# Patient Record
Sex: MALE | Race: WHITE
[De-identification: names, ages, dates, MRNs, and addresses within clinical notes are randomized per-mention and may not be internally consistent; named-entity substitution may affect disease eponyms.]

---

## 2018-01-23 ENCOUNTER — HOSPITAL ENCOUNTER (INPATIENT)
Dept: HOSPITAL 36 - ER | Age: 78
LOS: 8 days | Discharge: NURSING, REHAB OR LONG-TERM CARE | DRG: 885 | End: 2018-01-31
Attending: PSYCHIATRY & NEUROLOGY | Admitting: PSYCHIATRY & NEUROLOGY
Payer: MEDICARE

## 2018-01-23 VITALS — DIASTOLIC BLOOD PRESSURE: 66 MMHG | SYSTOLIC BLOOD PRESSURE: 124 MMHG

## 2018-01-23 DIAGNOSIS — E87.1: ICD-10-CM

## 2018-01-23 DIAGNOSIS — N40.0: ICD-10-CM

## 2018-01-23 DIAGNOSIS — E86.0: ICD-10-CM

## 2018-01-23 DIAGNOSIS — E11.9: ICD-10-CM

## 2018-01-23 DIAGNOSIS — E78.5: ICD-10-CM

## 2018-01-23 DIAGNOSIS — I50.9: ICD-10-CM

## 2018-01-23 DIAGNOSIS — I25.10: ICD-10-CM

## 2018-01-23 DIAGNOSIS — F03.90: ICD-10-CM

## 2018-01-23 DIAGNOSIS — F41.9: ICD-10-CM

## 2018-01-23 DIAGNOSIS — N39.0: ICD-10-CM

## 2018-01-23 DIAGNOSIS — L40.9: ICD-10-CM

## 2018-01-23 DIAGNOSIS — R19.7: ICD-10-CM

## 2018-01-23 DIAGNOSIS — F33.3: Primary | ICD-10-CM

## 2018-01-23 DIAGNOSIS — I11.0: ICD-10-CM

## 2018-01-23 LAB
ALBUMIN SERPL-MCNC: 3.8 GM/DL (ref 4.2–5.5)
ALBUMIN/GLOB SERPL: 1.1 {RATIO} (ref 1–1.8)
ALP SERPL-CCNC: 51 U/L (ref 34–104)
ALT SERPL-CCNC: 21 U/L (ref 7–52)
ANION GAP SERPL CALC-SCNC: 12.5 MMOL/L (ref 7–16)
APPEARANCE UR: (no result)
AST SERPL-CCNC: 22 U/L (ref 13–39)
BACTERIA #/AREA URNS HPF: (no result) /HPF
BASOPHILS # BLD AUTO: 0.3 TH/CUMM (ref 0–0.2)
BASOPHILS NFR BLD AUTO: 2.2 % (ref 0–2)
BILIRUB SERPL-MCNC: 0.8 MG/DL (ref 0.3–1)
BILIRUB UR-MCNC: NEGATIVE MG/DL
BUN SERPL-MCNC: 45 MG/DL (ref 7–25)
CALCIUM SERPL-MCNC: 8.9 MG/DL (ref 8.6–10.3)
CHLORIDE SERPL-SCNC: 92 MEQ/L (ref 98–107)
CO2 SERPL-SCNC: 25.3 MEQ/L (ref 21–31)
COLOR UR: YELLOW
CREAT SERPL-MCNC: 1.4 MG/DL (ref 0.7–1.3)
EOSINOPHIL # BLD AUTO: 0 TH/CMM (ref 0.1–0.4)
EOSINOPHIL NFR BLD AUTO: 0.2 % (ref 0–5)
EPI CELLS URNS QL MICRO: (no result) /LPF
ERYTHROCYTE [DISTWIDTH] IN BLOOD BY AUTOMATED COUNT: 12.7 % (ref 11.5–20)
GLOBULIN SER-MCNC: 3.6 GM/DL
GLUCOSE SERPL-MCNC: 357 MG/DL (ref 70–105)
GLUCOSE UR STRIP-MCNC: >=1000 MG/DL
HCT VFR BLD CALC: 40.1 % (ref 41–60)
HGB BLD-MCNC: 13.3 GM/DL (ref 12–16)
KETONES UR STRIP-MCNC: NEGATIVE MG/DL
LEUKOCYTE ESTERASE UR-ACNC: (no result)
LYMPHOCYTE AB SER FC-ACNC: 0.7 TH/CMM (ref 1.5–3)
LYMPHOCYTES NFR BLD AUTO: 5.6 % (ref 20–50)
MCH RBC QN AUTO: 29.4 PG (ref 27–31)
MCHC RBC AUTO-ENTMCNC: 33.1 PG (ref 28–36)
MCV RBC AUTO: 89 FL (ref 80–99)
MICRO URNS: YES
MONOCYTES # BLD AUTO: 0.2 TH/CMM (ref 0.3–1)
MONOCYTES NFR BLD AUTO: 1.9 % (ref 2–10)
NEUTROPHILS # BLD: 10.6 TH/CMM (ref 1.8–8)
NEUTROPHILS NFR BLD AUTO: 90.1 % (ref 40–80)
NITRITE UR QL STRIP: POSITIVE
PH UR STRIP: 5.5 [PH] (ref 4.6–8)
PLATELET # BLD: 245 TH/CMM (ref 150–400)
PMV BLD AUTO: 8.6 FL
POTASSIUM SERPL-SCNC: 3.8 MEQ/L (ref 3.5–5.1)
PROT UR STRIP-MCNC: 30 MG/DL
RBC # BLD AUTO: 4.5 MIL/CMM (ref 3.8–5.8)
RBC # UR STRIP: (no result) /UL
RBC #/AREA URNS HPF: (no result) /HPF (ref 0–5)
SODIUM SERPL-SCNC: 126 MEQ/L (ref 136–145)
SP GR UR STRIP: 1.01 (ref 1–1.03)
URINALYSIS COMPLETE PNL UR: (no result)
UROBILINOGEN UR STRIP-ACNC: 0.2 E.U./DL (ref 0.2–1)
WBC # BLD AUTO: 11.8 TH/CMM (ref 4.8–10.8)
WBC #/AREA URNS HPF: >100 /HPF (ref 0–5)

## 2018-01-23 PROCEDURE — Z7610: HCPCS

## 2018-01-23 PROCEDURE — X3904: HCPCS
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
## 2018-01-24 LAB
HBA1C MFR BLD: 12 % (ref 4–6)
HGB BLD-MCNC: 15 G/DL (ref 4–35)

## 2018-01-24 RX ADMIN — FENOFIBRATE SCH MG: 134 CAPSULE ORAL at 08:58

## 2018-01-24 RX ADMIN — INSULIN ASPART SCH UNITS: 100 INJECTION, SOLUTION INTRAVENOUS; SUBCUTANEOUS at 12:25

## 2018-01-24 RX ADMIN — Medication SCH TAB: at 09:00

## 2018-01-24 RX ADMIN — INSULIN DETEMIR SCH UNITS: 100 INJECTION, SOLUTION SUBCUTANEOUS at 21:40

## 2018-01-24 RX ADMIN — INSULIN ASPART SCH UNITS: 100 INJECTION, SOLUTION INTRAVENOUS; SUBCUTANEOUS at 16:40

## 2018-01-24 RX ADMIN — INSULIN ASPART SCH UNITS: 100 INJECTION, SOLUTION INTRAVENOUS; SUBCUTANEOUS at 21:34

## 2018-01-24 RX ADMIN — SULFAMETHOXAZOLE AND TRIMETHOPRIM SCH TAB: 800; 160 TABLET ORAL at 09:01

## 2018-01-24 RX ADMIN — SULFAMETHOXAZOLE AND TRIMETHOPRIM SCH TAB: 800; 160 TABLET ORAL at 16:41

## 2018-01-24 RX ADMIN — INSULIN ASPART SCH UNITS: 100 INJECTION, SOLUTION INTRAVENOUS; SUBCUTANEOUS at 06:40

## 2018-01-24 NOTE — DIAGNOSTIC IMAGING REPORT
Chest x-ray single view 



History: Shortness of breath



Comparison: None



The heart size is normal. No focal pulmonary parenchymal processes. No

hilar or mediastinal abnormalities.



Impression: No acute abnormalities

## 2018-01-25 RX ADMIN — Medication SCH TAB: at 09:51

## 2018-01-25 RX ADMIN — INSULIN ASPART SCH UNITS: 100 INJECTION, SOLUTION INTRAVENOUS; SUBCUTANEOUS at 17:27

## 2018-01-25 RX ADMIN — SULFAMETHOXAZOLE AND TRIMETHOPRIM SCH TAB: 800; 160 TABLET ORAL at 09:51

## 2018-01-25 RX ADMIN — INSULIN ASPART SCH: 100 INJECTION, SOLUTION INTRAVENOUS; SUBCUTANEOUS at 20:45

## 2018-01-25 RX ADMIN — INSULIN DETEMIR SCH UNITS: 100 INJECTION, SOLUTION SUBCUTANEOUS at 21:29

## 2018-01-25 RX ADMIN — FENOFIBRATE SCH MG: 134 CAPSULE ORAL at 09:52

## 2018-01-25 RX ADMIN — INSULIN ASPART SCH UNITS: 100 INJECTION, SOLUTION INTRAVENOUS; SUBCUTANEOUS at 12:34

## 2018-01-25 RX ADMIN — Medication SCH EACH: at 09:52
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
## 2018-01-25 NOTE — HISTORY & PHYSICAL
ADMIT DATE:  01/23/2018



HISTORY OF PRESENT ILLNESS:  The patient is a 77-year-old male with long history

of diabetes mellitus, hypertension, benign prostatic hypertrophy,

hyperlipidemia, dementia, admitted to [facility]

under Dr. Collins's service with acute agitation, dementia.  The patient is a poor

historian.



PAST MEDICAL HISTORY:  Significant for diabetes mellitus, hypertension, benign

prostatic hypertrophy, hyperlipidemia, dementia.



PAST SURGICAL HISTORY:  No recent surgery.



ALLERGIES:  None.



MEDICATIONS:  Follow admission reconciliation.



SOCIAL HISTORY:  Ex-smoker.  No alcohol or drugs.



FAMILY HISTORY:  Noncontributory.



REVIEW OF SYSTEMS:

_____ SYSTEM:  No history of chronic renal disorder.

CARDIOVASCULAR SYSTEM:  No coronary artery disease.  He has history of

hypertension.

ENDOCRINE SYSTEM:  History of diabetes mellitus.

GASTROINTESTINAL SYSTEM:  No upper or lower gastrointestinal bleed.

NEUROLOGICAL SYSTEM:  History of dementia.

SKELETOMUSCULAR SYSTEM:  No muscular dystrophy.

HEMATOLOGIC SYSTEM:  No bleeding tendencies.

RESPIRATORY SYSTEM:  No asthma.

GENITOURINARY:  No dysuria or hematuria.  The patient has benign prostatic

hypertrophy.



PHYSICAL EXAMINATION:

GENERAL:  He is awake, not coherent.

VITAL SIGNS:  Temperature 97.4, heart rate 70, blood pressure 159/71.

HEENT:  Normocephalic.  Pupils reacting to light and accommodation.  Sclerae

clear.

NECK:  Supple.  Negative for lymphadenopathy, JVD or bruit.

CHEST:  Entry of air bilaterally normal.   No rhonchi or wheezing.

HEART:  S1, S2 normal.  No gallop rhythm.

ABDOMEN:  Soft, bowel sounds positive.

EXTREMITIES:  No edema.

NEUROLOGIC:  He is awake, alert.  No focal motor deficits.



LABORATORY DATA:  White blood cell is 11.8, hemoglobin is 13.3, hematocrit is

40.1, and platelet is 245, neutrophils 90%.  Sodium 126, potassium 3.8, BUN 45,

creatinine 0.4, glucose 325.  Urinalysis:  White blood cells at 100, pH is 5.5,

specific gravity 1.010.



ASSESSMENT:

1.  Urinary tract infection.

2.  Hyponatremia.

3.  Diabetes mellitus.

4.  Hypertension.

5.  Benign prostatic hypertrophy.

6.  Dementia.



PLAN:  The patient admitted to the hospital under Dr. Collins's service.  Problems

addressed during hospitalization are dementia, urinary tract infection, diabetes

mellitus, hypertension.  The problems addressed at discharge are diabetes

mellitus, hypertension, dementia, benign prostatic hypertrophy.  The patient is

cleared for activity.



Thank you Dr. Collins for asking me to see your patient.  The patient will be on

Rocephin 1 gram IM once a day for 5 days.





DD: 01/24/2018 20:32

DT: 01/24/2018 20:54

JOB# [id number]  [id number]

## 2018-01-25 NOTE — INTERNAL MEDICINE PROG NOTE
Internal Medicine Subjective





- Subjective


Service Date: 18


Patient seen and examined:: with staff


Patient is:: non-interactive, in bed, confused


Per staff patient has:: no adverse event





Internal Medicine Objective





- Results


Result Diagrams: 


 18 21:21





 18 21:21


Recent Labs: 


 Laboratory Last Values











WBC  11.8 Th/cmm (4.8-10.8)  H  18  21:21    


 


RBC  4.50 Mil/cmm (3.80-5.80)   18  21:21    


 


Hgb  13.3 gm/dL (12-16)   18  21:21    


 


Hct  40.1 % (41.0-60)  L  18  21:21    


 


MCV  89.0 fl (80-99)   18  21:21    


 


MCH  29.4 pg (27.0-31.0)   18  21:    


 


MCHC Differential  33.1 pg (28.0-36.0)   18  21:21    


 


RDW  12.7 % (11.5-20.0)   18  21:21    


 


Plt Count  245 Th/cmm (150-400)   18  21:21    


 


MPV  8.6 fl  18  21:21    


 


Neutrophils %  90.1 % (40.0-80.0)  H  18  21:21    


 


Lymphocytes %  5.6 % (20.0-50.0)  L  18  21:    


 


Monocytes %  1.9 % (2.0-10.0)  L  18  21:21    


 


Eosinophils %  0.2 % (0.0-5.0)   18  21:21    


 


Basophils %  2.2 % (0.0-2.0)  H  18  21:21    


 


Sodium  126 mEq/L (136-145)  L  18  21:21    


 


Potassium  3.8 mEq/L (3.5-5.1)   18  21:21    


 


Chloride  92 mEq/L ()  L  18  21:21    


 


Carbon Dioxide  25.3 mEq/L (21.0-31.0)   18  21:    


 


Anion Gap  12.5  (7.0-16.0)   18  21:21    


 


BUN  45 mg/dL (7-25)  H  18  21:21    


 


Creatinine  1.4 mg/dL (0.7-1.3)  H  18  21:21    


 


Est GFR ( Amer)  TNP   18  21:21    


 


Est GFR (Non-Af Amer)  TNP   18  21:21    


 


BUN/Creatinine Ratio  32.1   18  21:21    


 


Glucose  357 mg/dL ()  H  18  21:21    


 


POC Glucose  292 MG/DL (70 - 105)  H  18  16:10    


 


Hemoglobin A1c %  12.0 % (4.0-6.0)  H  18  21:21    


 


Calcium  8.9 mg/dL (8.6-10.3)   18  21:21    


 


Total Bilirubin  0.8 mg/dL (0.3-1.0)   18  21:21    


 


AST  22 U/L (13-39)   18  21:21    


 


ALT  21 U/L (7-52)   18  21:21    


 


Alkaline Phosphatase  51 U/L ()   18  21:21    


 


Total Protein  7.4 gm/dL (6.0-8.3)   18  21:21    


 


Albumin  3.8 gm/dL (4.2-5.5)  L  18  21:21    


 


Globulin  3.6 gm/dL  18  21:21    


 


Albumin/Globulin Ratio  1.1  (1.0-1.8)   18  21:21    


 


TSH  1.06 uIU/ml (0.34-5.60)   18  21:21    


 


Urine Source  RANDOM   18  22:05    


 


Urine Color  YELLOW   18  22:05    


 


Urine Clarity  HAZY  (CLEAR)   18  22:05    


 


Urine pH  5.5  (4.6 - 8.0)   18  22:05    


 


Ur Specific Gravity  1.010  (1.005-1.030)   18  22:05    


 


Urine Protein  30 mg/dL (NEGATIVE)  H  18  22:05    


 


Urine Glucose (UA)  >=1000 mg/dL (NEGATIVE)  H  18  22:05    


 


Urine Ketones  NEGATIVE mg/dL (NEGATIVE)   18  22:05    


 


Urine Blood  TRACE  (NEGATIVE)   18  22:05    


 


Urine Nitrate  POSITIVE  (NEGATIVE)  H  18  22:05    


 


Urine Bilirubin  NEGATIVE  (NEGATIVE)   18  22:05    


 


Urine Urobilinogen  0.2 E.U./dL (0.2 - 1.0)   18  22:05    


 


Ur Leukocyte Esterase  MODERATE  (NEGATIVE)  H  18  22:05    


 


Urine RBC  0-2 /hpf (0-5)  H  18  22:05    


 


Urine WBC  >100 /hpf (0-5)  H  18  22:05    


 


Ur Epithelial Cells  FEW /lpf (FEW)   18  22:05    


 


Urine Bacteria  MANY /hpf (NONE SEEN)  H  18  22:05    














- Physical Exam


Vitals and I&O: 


 Vital Signs











Temp  97.8 F   18 14:00


 


Pulse  64   18 14:00


 


Resp  19   18 14:00


 


BP  137/84   18 17:28


 


Pulse Ox  96   18 14:00








 Intake & Output











 18





 18:59 06:59 18:59


 


Intake Total 1200  1200


 


Balance 1200  1200


 


Intake:   


 


  Oral 1200  1200


 


Other:   


 


  # Bowel Movements 1  1











Active Medications: 


Current Medications





Acetaminophen (Tylenol)  650 mg PO Q4HR PRN


   PRN Reason: Mild Pain / Temp above 100


   Stop: 18 00:00


   Last Admin: 18 15:56 Dose:  650 mg


Ascorbic Acid (Vitamin C)  500 mg [facility] DAILY [facility]


   Stop: 18 08:59


   Last Admin: 18 09:51 Dose:  500 mg


Atorvastatin Calcium (Lipitor)  40 mg PO DAILY [facility]


   Stop: 18 08:59


   Last Admin: 18 09:54 Dose:  40 mg


Bisacodyl (Dulcolax 10 Mg Supp)  10 mg RC DAILY PRN


   PRN Reason: Constipation


   Stop: 18 23:40


Carvedilol (Coreg)  50 mg PO DAILY [facility]


   Stop: 18 08:59


   Last Admin: 18 09:54 Dose:  50 mg


Dextrose (Glutose 40%)  37.5 gm PO DAILY PRN


   PRN Reason: LOW SUGAR


   Stop: 18 23:40


Docusate Sodium (Colace)  100 mg PO DAILY [facility]


   Stop: 18 08:59


   Last Admin: 18 09:51 Dose:  100 mg


Escitalopram Oxalate (Lexapro)  20 mg PO HS KYM


   PRN Reason: Protocol


   Stop: 18 20:59


   Last Admin: 18 21:29 Dose:  20 mg


Fenofibrate (Tricor)  134 mg PO DAILY [facility]


   Stop: 18 08:59


   Last Admin: 18 09:52 Dose:  134 mg


Furosemide (Lasix)  40 mg PO BID [facility]


   Stop: 18 08:59


   Last Admin: 18 17:28 Dose:  40 mg


Glucagon (Glucagen)  1 mg IM DAILY PRN; Protocol


   PRN Reason: LOW SUGAR (BG<70) 


   Stop: 18 23:40


Guaifenesin/Dextromethorphan (Diabetic Tussin Dm Sugar Free)  5 ml PO Q6HR PRN


   PRN Reason: Cough


   Stop: 18 23:40


Insulin Aspart (Novolog Insulin Sliding Scale)  0 units SUBQ ACHS KYM


   PRN Reason: Protocol


   Stop: 18 07:29


   Last Admin: 18 17:27 Dose:  4 units


Insulin Detemir (Levemir Insulin)  80 units SUBQ HS KYM


   PRN Reason: Protocol


   Stop: 18 20:59


   Last Admin: 18 21:40 Dose:  80 units


Lactobacillus Rhamnosus (Culturelle 15b)  1 each PO DAILY [facility]


   Stop: 18 08:59


   Last Admin: 18 09:52 Dose:  1 each


Lorazepam (Ativan)  1 mg PO Q6HR PRN; Protocol


   PRN Reason: Anxiety/agitation


   Stop: 18 23:40


   Last Admin: 18 17:28 Dose:  1 mg


Magnesium Hydroxide (Milk Of Magnesia)  30 ml PO DAILY PRN


   PRN Reason: Constipation


   Stop: 18 23:40


Miscellaneous (Probiotic Screen)  1 ea MC PRN PRN


   PRN Reason: PROTOCOL


   Stop: 18 16:44


Quetiapine Fumarate (Seroquel)  50 mg PO TID KYM


   PRN Reason: Protocol


   Stop: 18 08:59


   Last Admin: 18 13:53 Dose:  50 mg


Rivaroxaban (Xarelto)  20 mg PO 1700 [facility]


   Stop: 18 16:59


   Last Admin: 18 17:28 Dose:  20 mg


Tamsulosin HCl (Flomax)  0.4 mg PO HS KYM


   Stop: 18 20:59


   Last Admin: 18 21:29 Dose:  0.4 mg


Trazodone HCl (Desyrel)  200 mg PO HS KYM


   Stop: 18 20:59


   Last Admin: 18 21:30 Dose:  200 mg


Trimethoprim/Sulfamethoxazole (Bactrim Ds)  1 tab PO BID [facility]


   Stop: 18 18:00


   Last Admin: 18 09:51 Dose:  1 tab


Valsartan (Diovan)  320 mg PO DAILY [facility]


   Stop: 18 08:59


   Last Admin: 18 09:53 Dose:  320 mg


Zolpidem Tartrate (Ambien)  5 mg PO HS PRN


   PRN Reason: Insomnia


   Stop: 18 23:56


   Last Admin: 18 23:33 Dose:  5 mg








General: demented


HEENT: NC/AT, PERRLA, EOMI, anicteric sclerae, throat clear


Neck: Supple, No JVD, No thyromegaly, +2 carotid pulse wo bruit, No LAD


Lungs: CTAB


Cardiovascular: RRR, Normal S1, Normal S2, without murmur


Abdomen: non-distended


Extremities: clear


Neurological: no change





Internal Medicine Assmt/Plan





- Assessment


Assessment: 





1.UTI.


2.DM.


3.HTN.


4.BPH.


5.HYPONATREMIA.


6.DEMENTIA.





- Plan


Plan: 





CONTINUE ON CURRENT MEDICATION AND DIET.





Nutritional Asmnt/Malnutr-PDOC





- Dietary Evaluation


Malnutrition Findings (Please click <Entered> for more info): 








Nutritional Asmnt/Malnutrition                             Start:  18 14:

31


Text:                                                      Status: Complete    

  


Freq:                                                                          

  


 Document     18 14:33  LCHENG  (Rec: 18 14:41  LCHENG  JULIO [id number])


 Nutritional Asmnt/Malnutrition


     Patient General Information


      Nutritional Screening                      High Risk


      Diagnosis                                  psychosis


      Pertinent Medical Hx/Surgical Hx           HTN, CAD, CHF, dyslipidemia,


                                                 BPH, psoriasis, DM, anxiety


      Subjective Information                     Pt seen sitting in dinning


                                                 room at time of visit, just


                                                 finished lunch tray. Pt stated


                                                 he does not like cucumber and


                                                 cottage cheeze.


      Current Diet Order/ Nutrition Support      low sodium 2 gm, CCHO, DARI


      Pertinent Medications                      vitamin C, colace, lasix,


                                                 glucagen, novolog, levemir


      Pertinent Labs                              Na 126, K 3.8, Cl 92, BUN


                                                 45, Cr 1.4, Glucose 357, ALb


                                                 3.8


                                                  


     Nutritional Hx/Data


      Height                                     1.75 m


      Height (Calculated Centimeters)            175.3


      Current Weight (lbs)                       102.058 kg


      Weight (Calculated Kilograms)              102.1


      Weight (Calculated Grams)                  499698.3


      Ideal Body Weight                          160


      % Ideal Body Weight                        140


      Body Mass Index (BMI)                      33.2


      Weight Status                              Obese


     GI Symptoms


      GI Symptoms                                None


      Last BM                                    1/24 x 3


      Difficult in:                              None


      Usual diet at home                         pt stated he was not following


                                                 any diet restrictions


      Skin Integrity/Comment:                    ольга Campbell


     Estimated Nutritional Goals


      BEE in Kcals:                              Adj wt of IBW


      Calories/Kcals/Kg                          25-30


      Kcals Calculated                           6281-2886


      Protein:                                   Adj wt of IBW


      Protein g/k


      Protein Calculated                         80


      Fluid: ml                                  2000-2400ml (1ml/kcal)


     Nutritional Problem


      2. Problem


       Problem                                   obesity


       Etiology                                  possible excessive energy


                                                 intake


       Signs/Symptoms:                           BMI 33.2


      1. Problem


       Problem                                   altered nutrition related lab


                                                 values


       Etiology                                  hx of DM, excessive


                                                 carbohydrates intake


       Signs/Symptoms:                           Glucose 357, 


     Malnutrition Alert


      Protein-Calorie Malnutrition               N/A


      Is there a minimum of two criteria         No


       selected?                                 


       Query Text:Check all the applicable       


       criteria. A minimum of two criteria are   


       recommended for diagnosis of either       


       severe or non-severe malnutrition.        


     Intervention/Recommendation


      Comments                                   1. Continue with current diet


                                                 [facility]. [facility]


                                                 [facility] to pt. Pt receiptive, no


                                                 question or concern, will


                                                 follow the diet.


                                                 2. Monitor PO intake, wt, labs


                                                 and skin integrity


                                                 3. F/U as moderate risk in 3-5


                                                 days, -


     Expected Outcomes/Goals


      Expected Outcomes/Goals                    1. PO intake to meet at least


                                                 75% of nutritional needs.


                                                 2. Wt stability, skin to


                                                 remain intact, labs to improve

## 2018-01-26 RX ADMIN — INSULIN ASPART SCH: 100 INJECTION, SOLUTION INTRAVENOUS; SUBCUTANEOUS at 17:25

## 2018-01-26 RX ADMIN — FENOFIBRATE SCH MG: 134 CAPSULE ORAL at 09:08

## 2018-01-26 RX ADMIN — Medication SCH EACH: at 09:08

## 2018-01-26 RX ADMIN — SULFAMETHOXAZOLE AND TRIMETHOPRIM SCH TAB: 800; 160 TABLET ORAL at 09:09

## 2018-01-26 RX ADMIN — INSULIN DETEMIR SCH UNITS: 100 INJECTION, SOLUTION SUBCUTANEOUS at 21:03

## 2018-01-26 RX ADMIN — INSULIN ASPART SCH: 100 INJECTION, SOLUTION INTRAVENOUS; SUBCUTANEOUS at 11:58

## 2018-01-26 RX ADMIN — AMOXICILLIN AND CLAVULANATE POTASSIUM SCH TAB: 875; 125 TABLET, FILM COATED ORAL at 17:32

## 2018-01-26 RX ADMIN — Medication SCH TAB: at 09:09

## 2018-01-26 RX ADMIN — INSULIN ASPART SCH: 100 INJECTION, SOLUTION INTRAVENOUS; SUBCUTANEOUS at 06:36

## 2018-01-26 RX ADMIN — INSULIN ASPART SCH UNITS: 100 INJECTION, SOLUTION INTRAVENOUS; SUBCUTANEOUS at 21:03

## 2018-01-26 NOTE — INTERNAL MEDICINE PROG NOTE
Internal Medicine Subjective





- Subjective


Service Date: 18


Patient seen and examined:: with staff


Patient is:: non-interactive, in bed, confused


Per staff patient has:: no adverse event





Internal Medicine Objective





- Results


Result Diagrams: 


 18 21:21





 18 21:21


Recent Labs: 


 Laboratory Last Values











WBC  11.8 Th/cmm (4.8-10.8)  H  18  21:21    


 


RBC  4.50 Mil/cmm (3.80-5.80)   18  21:21    


 


Hgb  13.3 gm/dL (12-16)   18  21:21    


 


Hct  40.1 % (41.0-60)  L  18  21:21    


 


MCV  89.0 fl (80-99)   18  21:21    


 


MCH  29.4 pg (27.0-31.0)   18  21:    


 


MCHC Differential  33.1 pg (28.0-36.0)   18  21:21    


 


RDW  12.7 % (11.5-20.0)   18  21:21    


 


Plt Count  245 Th/cmm (150-400)   18  21:21    


 


MPV  8.6 fl  18  21:21    


 


Neutrophils %  90.1 % (40.0-80.0)  H  18  21:21    


 


Lymphocytes %  5.6 % (20.0-50.0)  L  18  21:    


 


Monocytes %  1.9 % (2.0-10.0)  L  18  21:21    


 


Eosinophils %  0.2 % (0.0-5.0)   18  21:21    


 


Basophils %  2.2 % (0.0-2.0)  H  18  21:21    


 


Sodium  126 mEq/L (136-145)  L  18  21:21    


 


Potassium  3.8 mEq/L (3.5-5.1)   18  21:21    


 


Chloride  92 mEq/L ()  L  18  21:21    


 


Carbon Dioxide  25.3 mEq/L (21.0-31.0)   18  21:    


 


Anion Gap  12.5  (7.0-16.0)   18  21:21    


 


BUN  45 mg/dL (7-25)  H  18  21:21    


 


Creatinine  1.4 mg/dL (0.7-1.3)  H  18  21:21    


 


Est GFR ( Amer)  TNP   18  21:21    


 


Est GFR (Non-Af Amer)  TNP   18  21:21    


 


BUN/Creatinine Ratio  32.1   18  21:21    


 


Glucose  357 mg/dL ()  H  18  21:21    


 


POC Glucose  134 MG/DL (70 - 105)  H  18  17:21    


 


Hemoglobin A1c %  12.0 % (4.0-6.0)  H  18  21:21    


 


Calcium  8.9 mg/dL (8.6-10.3)   18  21:21    


 


Total Bilirubin  0.8 mg/dL (0.3-1.0)   18  21:21    


 


AST  22 U/L (13-39)   18  21:21    


 


ALT  21 U/L (7-52)   18  21:21    


 


Alkaline Phosphatase  51 U/L ()   18  21:21    


 


Total Protein  7.4 gm/dL (6.0-8.3)   18  21:21    


 


Albumin  3.8 gm/dL (4.2-5.5)  L  18  21:21    


 


Globulin  3.6 gm/dL  18  21:21    


 


Albumin/Globulin Ratio  1.1  (1.0-1.8)   18  21:21    


 


TSH  1.06 uIU/ml (0.34-5.60)   18  21:21    


 


Urine Source  RANDOM   18  22:05    


 


Urine Color  YELLOW   18  22:05    


 


Urine Clarity  HAZY  (CLEAR)   18  22:05    


 


Urine pH  5.5  (4.6 - 8.0)   18  22:05    


 


Ur Specific Gravity  1.010  (1.005-1.030)   18  22:05    


 


Urine Protein  30 mg/dL (NEGATIVE)  H  18  22:05    


 


Urine Glucose (UA)  >=1000 mg/dL (NEGATIVE)  H  18  22:05    


 


Urine Ketones  NEGATIVE mg/dL (NEGATIVE)   18  22:05    


 


Urine Blood  TRACE  (NEGATIVE)   18  22:05    


 


Urine Nitrate  POSITIVE  (NEGATIVE)  H  18  22:05    


 


Urine Bilirubin  NEGATIVE  (NEGATIVE)   18  22:05    


 


Urine Urobilinogen  0.2 E.U./dL (0.2 - 1.0)   18  22:05    


 


Ur Leukocyte Esterase  MODERATE  (NEGATIVE)  H  18  22:05    


 


Urine RBC  0-2 /hpf (0-5)  H  18  22:05    


 


Urine WBC  >100 /hpf (0-5)  H  18  22:05    


 


Ur Epithelial Cells  FEW /lpf (FEW)   18  22:05    


 


Urine Bacteria  MANY /hpf (NONE SEEN)  H  18  22:05    














- Physical Exam


Vitals and I&O: 


 Vital Signs











Temp  97.0 F   18 15:00


 


Pulse  70   18 17:27


 


Resp  19   18 15:00


 


BP  109/67   18 17:27


 


Pulse Ox  95   18 15:00








 Intake & Output











 18





 18:59 06:59 18:59


 


Intake Total 1200 500 


 


Balance 1200 500 


 


Intake:   


 


  Oral 1200 500 


 


Other:   


 


  # Voids  2 


 


  # Bowel Movements 1 0 











Active Medications: 


Current Medications





Acetaminophen (Tylenol)  650 mg PO Q4HR PRN


   PRN Reason: Mild Pain / Temp above 100


   Stop: 18 00:00


   Last Admin: 18 15:56 Dose:  650 mg


Amoxicillin/Clavulanate Potassium (Augmentin 875-125mg)  1 tab PO BID [facility]


   Stop: 18 16:59


   Last Admin: 18 17:32 Dose:  1 tab


Ascorbic Acid (Vitamin C)  500 mg PO DAILY [facility]


   Stop: 18 08:59


   Last Admin: 18 09:09 Dose:  500 mg


Atorvastatin Calcium (Lipitor)  40 mg PO DAILY [facility]


   Stop: 18 08:59


   Last Admin: 18 09:09 Dose:  40 mg


Bisacodyl (Dulcolax 10 Mg Supp)  10 mg RC DAILY PRN


   PRN Reason: Constipation


   Stop: 18 23:40


Carvedilol (Coreg)  50 mg PO DAILY [facility]


   Stop: 18 08:59


   Last Admin: 18 17:26 Dose:  Not Given


Dextrose (Glutose 40%)  37.5 gm PO DAILY PRN


   PRN Reason: LOW SUGAR


   Stop: 18 23:40


Docusate Sodium (Colace)  100 mg PO DAILY [facility]


   Stop: 18 08:59


   Last Admin: 18 15:29 Dose:  Not Given


Escitalopram Oxalate (Lexapro)  20 mg PO HS [facility]


   PRN Reason: Protocol


   Stop: 18 20:59


   Last Admin: 18 20:44 Dose:  20 mg


Fenofibrate (Tricor)  134 mg PO DAILY [facility]


   Stop: 18 08:59


   Last Admin: 18 09:08 Dose:  134 mg


Furosemide (Lasix)  40 mg PO BID [facility]


   Stop: 18 08:59


   Last Admin: 18 17:27 Dose:  Not Given


Glucagon (Glucagen)  1 mg IM DAILY PRN; Protocol


   PRN Reason: LOW SUGAR (BG<70) 


   Stop: 18 23:40


Guaifenesin/Dextromethorphan (Diabetic Tussin Dm Sugar Free)  5 ml PO Q6HR PRN


   PRN Reason: Cough


   Stop: 18 23:40


Insulin Aspart (Novolog Insulin Sliding Scale)  0 units SUBQ ACHS [facility]


   PRN Reason: Protocol


   Stop: 18 07:29


   Last Admin: 18 17:25 Dose:  Not Given


Insulin Detemir (Levemir Insulin)  80 units SUBQ HS [facility]


   PRN Reason: Protocol


   Stop: 18 20:59


   Last Admin: 18 21:29 Dose:  80 units


Lactobacillus Rhamnosus (Culturelle 15b)  1 each PO DAILY [facility]


   Stop: 18 08:59


   Last Admin: 18 09:08 Dose:  1 each


Lorazepam (Ativan)  1 mg PO Q6HR PRN; Protocol


   PRN Reason: Anxiety/agitation


   Stop: 18 23:40


   Last Admin: 18 15:35 Dose:  1 mg


Magnesium Hydroxide (Milk Of Magnesia)  30 ml PO DAILY PRN


   PRN Reason: Constipation


   Stop: 18 23:40


Miscellaneous (Probiotic Screen)  1 ea MC PRN PRN


   PRN Reason: PROTOCOL


   Stop: 18 16:44


Quetiapine Fumarate (Seroquel)  50 mg PO TID [facility]


   PRN Reason: Protocol


   Stop: 18 08:59


   Last Admin: 18 14:25 Dose:  50 mg


Rivaroxaban (Xarelto)  20 mg PO 1700 [facility]


   Stop: 18 16:59


   Last Admin: 18 17:32 Dose:  20 mg


Tamsulosin HCl (Flomax)  0.4 mg PO HS [facility]


   Stop: 18 20:59


   Last Admin: 18 20:45 Dose:  0.4 mg


Trazodone HCl (Desyrel)  200 mg PO HS [facility]


   Stop: 18 20:59


   Last Admin: 18 20:44 Dose:  200 mg


Valsartan (Diovan)  320 mg PO DAILY [facility]


   Stop: 18 08:59


   Last Admin: 18 17:27 Dose:  Not Given


Zolpidem Tartrate (Ambien)  5 mg PO HS PRN


   PRN Reason: Insomnia


   Stop: 18 23:56


   Last Admin: 18 23:24 Dose:  5 mg








General: demented


HEENT: NC/AT, PERRLA, EOMI, anicteric sclerae, throat clear


Neck: Supple, No JVD, No thyromegaly, +2 carotid pulse wo bruit, No LAD


Lungs: CTAB


Cardiovascular: RRR, Normal S1, Normal S2, without murmur


Abdomen: non-distended


Extremities: clear


Neurological: no change





Internal Medicine Assmt/Plan





- Assessment


Assessment: 





1.UTI.


2.DM.


3.HTN.


4.BPH.


5.HYPONATREMIA.


6.DEMENTIA.





- Plan


Plan: 





CONTINUE ON CURRENT MEDICATION AND DIET.CHANGE ANTIBIOTIC TO AUGMENTIN 875 MG 

BID.





Nutritional Asmnt/Malnutr-PDOC





- Dietary Evaluation


Malnutrition Findings (Please click <Entered> for more info): 








Nutritional Asmnt/Malnutrition                             Start:  18 14:

31


Text:                                                      Status: Complete    

  


Freq:                                                                          

  


 Document     18 14:33  LCHENG  (Rec: 18 14:41  [facility]  [id number])


 Nutritional Asmnt/Malnutrition


     Patient General Information


      Nutritional Screening                      High Risk


      Diagnosis                                  psychosis


      Pertinent Medical Hx/Surgical Hx           HTN, CAD, CHF, dyslipidemia,


                                                 BPH, psoriasis, DM, anxiety


      Subjective Information                     Pt seen sitting in dinning


                                                 room at time of visit, just


                                                 finished lunch tray. Pt stated


                                                 he does not like cucumber and


                                                 cottage cheeze.


      Current Diet Order/ Nutrition Support      low sodium 2 gm, CCHO, DARI


      Pertinent Medications                      vitamin C, colace, lasix,


                                                 glucagen, novolog, levemir


      Pertinent Labs                              Na 126, K 3.8, Cl 92, BUN


                                                 45, Cr 1.4, Glucose 357, ALb


                                                 3.8


                                                  


     Nutritional Hx/Data


      Height                                     1.75 m


      Height (Calculated Centimeters)            175.3


      Current Weight (lbs)                       102.058 kg


      Weight (Calculated Kilograms)              102.1


      Weight (Calculated Grams)                  292805.3


      Ideal Body Weight                          160


      % Ideal Body Weight                        140


      Body Mass Index (BMI)                      33.2


      Weight Status                              Obese


     GI Symptoms


      GI Symptoms                                None


      Last BM                                    1/24 x 3


      Difficult in:                              None


      Usual diet at home                         pt stated he was not following


                                                 any diet restrictions


      Skin Integrity/Comment:                    ольга Campbell


     Estimated Nutritional Goals


      BEE in Kcals:                              Adj wt of IBW


      Calories/Kcals/Kg                          25-30


      Kcals Calculated                           4653-2005


      Protein:                                   Adj wt of IBW


      Protein g/k


      Protein Calculated                         80


      Fluid: ml                                  2000-2400ml (1ml/kcal)


     Nutritional Problem


      2. Problem


       Problem                                   obesity


       Etiology                                  possible excessive energy


                                                 intake


       Signs/Symptoms:                           BMI 33.2


      1. Problem


       Problem                                   altered nutrition related lab


                                                 values


       Etiology                                  hx of DM, excessive


                                                 carbohydrates intake


       Signs/Symptoms:                           Glucose 357, 


     Malnutrition Alert


      Protein-Calorie Malnutrition               N/A


      Is there a minimum of two criteria         No


       selected?                                 


       Query Text:Check all the applicable       


       criteria. A minimum of two criteria are   


       recommended for diagnosis of either       


       severe or non-severe malnutrition.        


     Intervention/Recommendation


      Comments                                   1. Continue with current diet


                                                 as ordered. Explained [facility]


                                                 diet to pt. Pt receiptive, no


                                                 question or concern, will


                                                 follow the diet.


                                                 2. Monitor PO intake, wt, labs


                                                 and skin integrity


                                                 3. F/U as moderate risk in 3-5


                                                 days, -


     Expected Outcomes/Goals


      Expected Outcomes/Goals                    1. PO intake to meet at least


                                                 75% of nutritional needs.


                                                 2. Wt stability, skin to


                                                 remain intact, labs to improve

## 2018-01-26 NOTE — PROGRESS NOTES
DATE:  01/25/2018



SUBJECTIVE:  Chart reviewed and the patient interviewed.  Also discussed the

patient's condition with the staff and reviewed the records and labs.  The

patient is still extremely agitated and he is still in irritable mood.  The

patient also is needy and he is still suspicious and paranoid.  The patient also

is still needy and is demanding and the patient constantly asking staff to

change his diapers and when they changes the diapers, shortly after he take off

the diaper and then ask them to change it again.  He is also confused and he is

in irritable and angry mood.  Also, he is having difficulty following directions

and he is still unable to sleep at night.



ASSESSMENT:  The patient is still psychotic and is agitated.



TREATMENT PLAN:  We will continue to monitor his behavior and his condition

closely.  Also, we will add Seroquel in a dose of 50 mg 3 times a day.  Also,

continue to work on behavioral modification.  Yesterday, the patient was given

emergency dose of Haldol, Ativan, and Benadryl to calm him down.  No side

effects.





DD: 01/25/2018 07:26

DT: 01/26/2018 04:44

JOB# [id number]  [id number]

## 2018-01-27 RX ADMIN — INSULIN ASPART SCH UNITS: 100 INJECTION, SOLUTION INTRAVENOUS; SUBCUTANEOUS at 21:22

## 2018-01-27 RX ADMIN — INSULIN DETEMIR SCH UNITS: 100 INJECTION, SOLUTION SUBCUTANEOUS at 21:23

## 2018-01-27 RX ADMIN — FENOFIBRATE SCH MG: 134 CAPSULE ORAL at 08:31

## 2018-01-27 RX ADMIN — INSULIN ASPART SCH: 100 INJECTION, SOLUTION INTRAVENOUS; SUBCUTANEOUS at 06:59

## 2018-01-27 RX ADMIN — Medication SCH TAB: at 08:25

## 2018-01-27 RX ADMIN — AMOXICILLIN AND CLAVULANATE POTASSIUM SCH TAB: 875; 125 TABLET, FILM COATED ORAL at 08:30

## 2018-01-27 RX ADMIN — INSULIN ASPART SCH: 100 INJECTION, SOLUTION INTRAVENOUS; SUBCUTANEOUS at 11:28

## 2018-01-27 RX ADMIN — INSULIN ASPART SCH: 100 INJECTION, SOLUTION INTRAVENOUS; SUBCUTANEOUS at 16:33

## 2018-01-27 RX ADMIN — AMOXICILLIN AND CLAVULANATE POTASSIUM SCH TAB: 875; 125 TABLET, FILM COATED ORAL at 17:26

## 2018-01-27 RX ADMIN — Medication SCH EACH: at 08:26

## 2018-01-27 NOTE — INTERNAL MEDICINE PROG NOTE
Internal Medicine Subjective





- Subjective


Service Date: 18


Patient seen and examined:: without staff


Patient is:: non-interactive, in bed, confused


Per staff patient has:: no adverse event





Internal Medicine Objective





- Results


Result Diagrams: 


 18 21:21





 18 21:21


Recent Labs: 


 Laboratory Last Values











WBC  11.8 Th/cmm (4.8-10.8)  H  18  21:21    


 


RBC  4.50 Mil/cmm (3.80-5.80)   18  21:21    


 


Hgb  13.3 gm/dL (12-16)   18  21:21    


 


Hct  40.1 % (41.0-60)  L  18  21:21    


 


MCV  89.0 fl (80-99)   18  21:21    


 


MCH  29.4 pg (27.0-31.0)   18  21:    


 


MCHC Differential  33.1 pg (28.0-36.0)   18  21:21    


 


RDW  12.7 % (11.5-20.0)   18  21:21    


 


Plt Count  245 Th/cmm (150-400)   18  21:21    


 


MPV  8.6 fl  18  21:21    


 


Neutrophils %  90.1 % (40.0-80.0)  H  18  21:21    


 


Lymphocytes %  5.6 % (20.0-50.0)  L  18  21:    


 


Monocytes %  1.9 % (2.0-10.0)  L  18  21:21    


 


Eosinophils %  0.2 % (0.0-5.0)   18  21:21    


 


Basophils %  2.2 % (0.0-2.0)  H  18  21:21    


 


Sodium  126 mEq/L (136-145)  L  18  21:21    


 


Potassium  3.8 mEq/L (3.5-5.1)   18  21:21    


 


Chloride  92 mEq/L ()  L  18  21:21    


 


Carbon Dioxide  25.3 mEq/L (21.0-31.0)   18  21:    


 


Anion Gap  12.5  (7.0-16.0)   18  21:21    


 


BUN  45 mg/dL (7-25)  H  18  21:21    


 


Creatinine  1.4 mg/dL (0.7-1.3)  H  18  21:21    


 


Est GFR ( Amer)  TNP   18  21:21    


 


Est GFR (Non-Af Amer)  TNP   18  21:21    


 


BUN/Creatinine Ratio  32.1   18  21:21    


 


Glucose  357 mg/dL ()  H  18  21:21    


 


POC Glucose  76 MG/DL (70 - 105)   18  06:54    


 


Hemoglobin A1c %  12.0 % (4.0-6.0)  H  18  21:21    


 


Calcium  8.9 mg/dL (8.6-10.3)   18  21:21    


 


Total Bilirubin  0.8 mg/dL (0.3-1.0)   18  21:21    


 


AST  22 U/L (13-39)   18  21:21    


 


ALT  21 U/L (7-52)   18  21:21    


 


Alkaline Phosphatase  51 U/L ()   18  21:21    


 


Total Protein  7.4 gm/dL (6.0-8.3)   18  21:21    


 


Albumin  3.8 gm/dL (4.2-5.5)  L  18  21:21    


 


Globulin  3.6 gm/dL  18  21:21    


 


Albumin/Globulin Ratio  1.1  (1.0-1.8)   18  21:21    


 


TSH  1.06 uIU/ml (0.34-5.60)   18  21:21    


 


Urine Source  RANDOM   18  22:05    


 


Urine Color  YELLOW   18  22:05    


 


Urine Clarity  HAZY  (CLEAR)   18  22:05    


 


Urine pH  5.5  (4.6 - 8.0)   18  22:05    


 


Ur Specific Gravity  1.010  (1.005-1.030)   18  22:05    


 


Urine Protein  30 mg/dL (NEGATIVE)  H  18  22:05    


 


Urine Glucose (UA)  >=1000 mg/dL (NEGATIVE)  H  18  22:05    


 


Urine Ketones  NEGATIVE mg/dL (NEGATIVE)   18  22:05    


 


Urine Blood  TRACE  (NEGATIVE)   18  22:05    


 


Urine Nitrate  POSITIVE  (NEGATIVE)  H  18  22:05    


 


Urine Bilirubin  NEGATIVE  (NEGATIVE)   18  22:05    


 


Urine Urobilinogen  0.2 E.U./dL (0.2 - 1.0)   18  22:05    


 


Ur Leukocyte Esterase  MODERATE  (NEGATIVE)  H  18  22:05    


 


Urine RBC  0-2 /hpf (0-5)  H  18  22:05    


 


Urine WBC  >100 /hpf (0-5)  H  18  22:05    


 


Ur Epithelial Cells  FEW /lpf (FEW)   18  22:05    


 


Urine Bacteria  MANY /hpf (NONE SEEN)  H  18  22:05    














- Physical Exam


Vitals and I&O: 


 Vital Signs











Temp  98.8 F   18 14:00


 


Pulse  90   18 14:00


 


Resp  20   18 14:00


 


BP  102/54   18 17:26


 


Pulse Ox  97   18 14:00








 Intake & Output











 18





 06:59 18:59 06:59


 


Intake Total  900 


 


Balance  900 


 


Intake:   


 


  Oral  900 


 


Other:   


 


  # Voids  3 


 


  # Bowel Movements  4 











Active Medications: 


Current Medications





Acetaminophen (Tylenol)  650 mg PO Q4HR PRN


   PRN Reason: Mild Pain / Temp above 100


   Stop: 18 00:00


   Last Admin: 18 15:56 Dose:  650 mg


Amoxicillin/Clavulanate Potassium (Augmentin 875-125mg)  1 tab PO BID [facility]


   Stop: 18 16:59


   Last Admin: 18 17:26 Dose:  1 tab


Ascorbic Acid (Vitamin C)  500 mg PO DAILY [facility]


   Stop: 18 08:59


   Last Admin: 18 08:32 Dose:  500 mg


Atorvastatin Calcium (Lipitor)  40 mg PO DAILY [facility]


   Stop: 18 08:59


   Last Admin: 18 08:25 Dose:  40 mg


Bisacodyl (Dulcolax 10 Mg Supp)  10 mg RC DAILY PRN


   PRN Reason: Constipation


   Stop: 18 23:40


Carvedilol (Coreg)  50 mg PO DAILY [facility]


   Stop: 18 08:59


   Last Admin: 18 08:27 Dose:  50 mg


Dextrose (Glutose 40%)  37.5 gm PO DAILY PRN


   PRN Reason: LOW SUGAR


   Stop: 18 23:40


Docusate Sodium (Colace)  100 mg PO DAILY [facility]


   Stop: 18 08:59


   Last Admin: 18 08:31 Dose:  100 mg


Escitalopram Oxalate (Lexapro)  20 mg PO HS [facility]


   PRN Reason: Protocol


   Stop: 18 20:59


   Last Admin: 18 20:10 Dose:  20 mg


Fenofibrate (Tricor)  134 mg PO DAILY [facility]


   Stop: 18 08:59


   Last Admin: 18 08:31 Dose:  134 mg


Furosemide (Lasix)  40 mg PO BID [facility]


   Stop: 18 08:59


   Last Admin: 18 17:26 Dose:  40 mg


Glucagon (Glucagen)  1 mg IM DAILY PRN; Protocol


   PRN Reason: LOW SUGAR (BG<70) 


   Stop: 18 23:40


Guaifenesin/Dextromethorphan (Diabetic Tussin Dm Sugar Free)  5 ml PO Q6HR PRN


   PRN Reason: Cough


   Stop: 18 23:40


Insulin Aspart (Novolog Insulin Sliding Scale)  0 units SUBQ ACHS [facility]


   PRN Reason: Protocol


   Stop: 18 07:29


   Last Admin: 18 16:33 Dose:  Not Given


Insulin Detemir (Levemir Insulin)  80 units SUBQ HS [facility]


   PRN Reason: Protocol


   Stop: 18 20:59


   Last Admin: 18 21:03 Dose:  80 units


Lactobacillus Rhamnosus (Culturelle 15b)  1 each PO DAILY [facility]


   Stop: 18 08:59


   Last Admin: 18 08:26 Dose:  1 each


Lorazepam (Ativan)  1 mg PO Q6HR PRN; Protocol


   PRN Reason: Anxiety/agitation


   Stop: 18 23:40


   Last Admin: 18 23:01 Dose:  1 mg


Magnesium Hydroxide (Milk Of Magnesia)  30 ml PO DAILY PRN


   PRN Reason: Constipation


   Stop: 18 23:40


Miscellaneous (Probiotic Screen)  1 ea MC PRN PRN


   PRN Reason: PROTOCOL


   Stop: 18 16:44


Quetiapine Fumarate (Seroquel)  50 mg PO TID [facility]


   PRN Reason: Protocol


   Stop: 18 08:59


   Last Admin: 18 14:27 Dose:  50 mg


Rivaroxaban (Xarelto)  20 mg PO 1700 [facility]


   Stop: 18 16:59


   Last Admin: 18 17:26 Dose:  20 mg


Tamsulosin HCl (Flomax)  0.4 mg PO HS [facility]


   Stop: 18 20:59


   Last Admin: 18 20:10 Dose:  0.4 mg


Trazodone HCl (Desyrel)  200 mg PO HS [facility]


   Stop: 18 20:59


   Last Admin: 18 20:10 Dose:  200 mg


Valsartan (Diovan)  320 mg PO DAILY [facility]


   Stop: 18 08:59


   Last Admin: 18 08:26 Dose:  320 mg


Zolpidem Tartrate (Ambien)  5 mg PO HS PRN


   PRN Reason: Insomnia


   Stop: 18 23:56


   Last Admin: 18 23:01 Dose:  5 mg








General: demented


HEENT: NC/AT, PERRLA, EOMI, anicteric sclerae, throat clear


Neck: Supple, No JVD, No thyromegaly, +2 carotid pulse wo bruit, No LAD


Lungs: CTAB


Cardiovascular: RRR, Normal S1, Normal S2, without murmur


Abdomen: non-distended


Extremities: clear


Neurological: no change





Internal Medicine Assmt/Plan





- Assessment


Assessment: 





1.UTI.


2.DM.


3.HTN.


4.BPH.


5.HYPONATREMIA.


6.DEMENTIA.





- Plan


Plan: 





CONTINUE ON CURRENT MEDICATION AND DIET.





Nutritional Asmnt/Malnutr-PDOC





- Dietary Evaluation


Malnutrition Findings (Please click <Entered> for more info): 








Nutritional Asmnt/Malnutrition                             Start:  18 14:

31


Text:                                                      Status: Complete    

  


Freq:                                                                          

  


 Document     18 14:33  LCHENG  (Rec: 18 14:41  LCHENG  [id number])


 Nutritional Asmnt/Malnutrition


     Patient General Information


      Nutritional Screening                      High Risk


      Diagnosis                                  psychosis


      Pertinent Medical Hx/Surgical Hx           HTN, CAD, CHF, dyslipidemia,


                                                 BPH, psoriasis, DM, anxiety


      Subjective Information                     Pt seen sitting in dinning


                                                 room at time of visit, just


                                                 finished lunch tray. Pt stated


                                                 he does not like cucumber and


                                                 cottage cheeze.


      Current Diet Order/ Nutrition Support      low sodium 2 gm, CCHO, DARI


      Pertinent Medications                      vitamin C, colace, lasix,


                                                 glucagen, novolog, levemir


      Pertinent Labs                              Na 126, K 3.8, Cl 92, BUN


                                                 45, Cr 1.4, Glucose 357, ALb


                                                 3.8


                                                  


     Nutritional Hx/Data


      Height                                     1.75 m


      Height (Calculated Centimeters)            175.3


      Current Weight (lbs)                       102.058 kg


      Weight (Calculated Kilograms)              102.1


      Weight (Calculated Grams)                  963221.3


      Ideal Body Weight                          160


      % Ideal Body Weight                        140


      Body Mass Index (BMI)                      33.2


      Weight Status                              Obese


     GI Symptoms


      GI Symptoms                                None


      Last BM                                    1/24 x 3


      Difficult in:                              None


      Usual diet at home                         pt stated he was not following


                                                 any diet restrictions


      Skin Integrity/Comment:                    ольга Campbell


     Estimated Nutritional Goals


      BEE in Kcals:                              Adj wt of IBW


      Calories/Kcals/Kg                          25-30


      Kcals Calculated                           2564-6194


      Protein:                                   Adj wt of IBW


      Protein g/k


      Protein Calculated                         80


      Fluid: ml                                  2000-2400ml (1ml/kcal)


     Nutritional Problem


      2. Problem


       Problem                                   obesity


       Etiology                                  possible excessive energy


                                                 intake


       Signs/Symptoms:                           BMI 33.2


      1. Problem


       Problem                                   altered nutrition related lab


                                                 values


       Etiology                                  hx of DM, excessive


                                                 carbohydrates intake


       Signs/Symptoms:                           Glucose 357, 


     Malnutrition Alert


      Protein-Calorie Malnutrition               N/A


      Is there a minimum of two criteria         No


       selected?                                 


       Query Text:Check all the applicable       


       criteria. A minimum of two criteria are   


       recommended for diagnosis of either       


       severe or non-severe malnutrition.        


     Intervention/Recommendation


      Comments                                   1. Continue with current diet


                                                 as ordered. Explained [facility]


                                                 diet to pt. Pt receiptive, no


                                                 question or concern, will


                                                 follow the diet.


                                                 2. Monitor PO intake, wt, labs


                                                 and skin integrity


                                                 3. F/U as moderate risk in 3-5


                                                 days, -


     Expected Outcomes/Goals


      Expected Outcomes/Goals                    1. PO intake to meet at least


                                                 75% of nutritional needs.


                                                 2. Wt stability, skin to


                                                 remain intact, labs to improve

## 2018-01-27 NOTE — PROGRESS NOTES
DATE:  01/26/2018



SUBJECTIVE:  Chart reviewed and the patient interviewed.  Also discussed the

patient's condition with the staff and reviewed records and labs.  The patient

continued to be suspicious and severely paranoid.  The patient also is taking

off his clothes and also asking to change his diaper and after staff changing

his diaper, he takes off his clothes again and take off the diaper and asks to

staff to change with a new diaper.  He is also still easily agitated and

confused and he is still rambling with disorganized thoughts.  Also unable to

follow any of the staff directions.



ASSESSMENT:  The patient is still confused and is still agitated.



TREATMENT PLAN:  The patient started on Seroquel 50 mg 3 times a day.  We will

continue same dose.  Also, continue to monitor his behavior and his condition

closely and also we will place the patient on 5250 hold for dangerous to others

as well as grave disability.





DD: 01/26/2018 08:02

DT: 01/27/2018 07:34

JOB# [id number]  [id number]

## 2018-01-28 RX ADMIN — FENOFIBRATE SCH MG: 134 CAPSULE ORAL at 09:05

## 2018-01-28 RX ADMIN — AMOXICILLIN AND CLAVULANATE POTASSIUM SCH TAB: 875; 125 TABLET, FILM COATED ORAL at 09:04

## 2018-01-28 RX ADMIN — INSULIN ASPART SCH UNITS: 100 INJECTION, SOLUTION INTRAVENOUS; SUBCUTANEOUS at 17:23

## 2018-01-28 RX ADMIN — Medication SCH TAB: at 09:04

## 2018-01-28 RX ADMIN — AMOXICILLIN AND CLAVULANATE POTASSIUM SCH TAB: 875; 125 TABLET, FILM COATED ORAL at 16:41

## 2018-01-28 RX ADMIN — INSULIN ASPART SCH UNITS: 100 INJECTION, SOLUTION INTRAVENOUS; SUBCUTANEOUS at 12:03

## 2018-01-28 RX ADMIN — INSULIN ASPART SCH UNITS: 100 INJECTION, SOLUTION INTRAVENOUS; SUBCUTANEOUS at 21:26

## 2018-01-28 RX ADMIN — Medication SCH: at 08:48

## 2018-01-28 RX ADMIN — INSULIN ASPART SCH: 100 INJECTION, SOLUTION INTRAVENOUS; SUBCUTANEOUS at 07:05

## 2018-01-28 RX ADMIN — INSULIN DETEMIR SCH UNITS: 100 INJECTION, SOLUTION SUBCUTANEOUS at 21:26

## 2018-01-28 NOTE — INTERNAL MEDICINE PROG NOTE
Internal Medicine Subjective





- Subjective


Service Date: 18


Patient seen and examined:: with staff (he has loose stool.no abdominal pain or 

nausea), without staff


Patient is:: non-interactive, in bed, confused


Per staff patient has:: no adverse event





Internal Medicine Objective





- Results


Result Diagrams: 


 18 21:21





 18 21:21


Recent Labs: 


 Laboratory Last Values











WBC  11.8 Th/cmm (4.8-10.8)  H  18  21:21    


 


RBC  4.50 Mil/cmm (3.80-5.80)   18  21:21    


 


Hgb  13.3 gm/dL (12-16)   18  21:21    


 


Hct  40.1 % (41.0-60)  L  18  21:21    


 


MCV  89.0 fl (80-99)   18  21:21    


 


MCH  29.4 pg (27.0-31.0)   18  21:    


 


MCHC Differential  33.1 pg (28.0-36.0)   18  21:21    


 


RDW  12.7 % (11.5-20.0)   18  21:21    


 


Plt Count  245 Th/cmm (150-400)   18  21:21    


 


MPV  8.6 fl  18  21:21    


 


Neutrophils %  90.1 % (40.0-80.0)  H  18  21:21    


 


Lymphocytes %  5.6 % (20.0-50.0)  L  18  21:    


 


Monocytes %  1.9 % (2.0-10.0)  L  18  21:21    


 


Eosinophils %  0.2 % (0.0-5.0)   18  21:    


 


Basophils %  2.2 % (0.0-2.0)  H  18  21:21    


 


Sodium  126 mEq/L (136-145)  L  18  21:21    


 


Potassium  3.8 mEq/L (3.5-5.1)   18  21:21    


 


Chloride  92 mEq/L ()  L  18  21:21    


 


Carbon Dioxide  25.3 mEq/L (21.0-31.0)   18  21:21    


 


Anion Gap  12.5  (7.0-16.0)   18  21:21    


 


BUN  45 mg/dL (7-25)  H  18  21:21    


 


Creatinine  1.4 mg/dL (0.7-1.3)  H  18  21:21    


 


Est GFR ( Amer)  TNP   18  21:21    


 


Est GFR (Non-Af Amer)  TNP   18  21:21    


 


BUN/Creatinine Ratio  32.1   18  21:21    


 


Glucose  357 mg/dL ()  H  18  21:21    


 


POC Glucose  268 MG/DL (70 - 105)  H  18  16:55    


 


Hemoglobin A1c %  12.0 % (4.0-6.0)  H  18  21:21    


 


Calcium  8.9 mg/dL (8.6-10.3)   18  21:21    


 


Total Bilirubin  0.8 mg/dL (0.3-1.0)   18  21:21    


 


AST  22 U/L (13-39)   18  21:21    


 


ALT  21 U/L (7-52)   18  21:21    


 


Alkaline Phosphatase  51 U/L ()   18  21:21    


 


Total Protein  7.4 gm/dL (6.0-8.3)   18  21:21    


 


Albumin  3.8 gm/dL (4.2-5.5)  L  18  21:21    


 


Globulin  3.6 gm/dL  18  21:21    


 


Albumin/Globulin Ratio  1.1  (1.0-1.8)   18  21:21    


 


TSH  1.06 uIU/ml (0.34-5.60)   18  21:21    


 


Urine Source  RANDOM   18  22:05    


 


Urine Color  YELLOW   18  22:05    


 


Urine Clarity  HAZY  (CLEAR)   18  22:05    


 


Urine pH  5.5  (4.6 - 8.0)   18  22:05    


 


Ur Specific Gravity  1.010  (1.005-1.030)   18  22:05    


 


Urine Protein  30 mg/dL (NEGATIVE)  H  18  22:05    


 


Urine Glucose (UA)  >=1000 mg/dL (NEGATIVE)  H  18  22:05    


 


Urine Ketones  NEGATIVE mg/dL (NEGATIVE)   18  22:05    


 


Urine Blood  TRACE  (NEGATIVE)   18  22:05    


 


Urine Nitrate  POSITIVE  (NEGATIVE)  H  18  22:05    


 


Urine Bilirubin  NEGATIVE  (NEGATIVE)   18  22:05    


 


Urine Urobilinogen  0.2 E.U./dL (0.2 - 1.0)   18  22:05    


 


Ur Leukocyte Esterase  MODERATE  (NEGATIVE)  H  18  22:05    


 


Urine RBC  0-2 /hpf (0-5)  H  18  22:05    


 


Urine WBC  >100 /hpf (0-5)  H  18  22:05    


 


Ur Epithelial Cells  FEW /lpf (FEW)   18  22:05    


 


Urine Bacteria  MANY /hpf (NONE SEEN)  H  18  22:05    














- Physical Exam


Vitals and I&O: 


 Vital Signs











Temp  98 F   18 14:00


 


Pulse  76   18 14:00


 


Resp  20   18 14:00


 


BP  130/68   18 16:41


 


Pulse Ox  20   18 14:00








 Intake & Output











 18





 18:59 06:59 18:59


 


Intake Total 900  2200


 


Balance 900  2200


 


Intake:   


 


  Oral 900  2200


 


Other:   


 


  # Voids 3  4


 


  # Bowel Movements 4  2


 


  Stool Characteristics  Liquid Soft





  Brown Liquid





   Brown











Active Medications: 


Current Medications





Acetaminophen (Tylenol)  650 mg PO Q4HR PRN


   PRN Reason: Mild Pain / Temp above 100


   Stop: 18 00:00


   Last Admin: 18 12:11 Dose:  650 mg


Amoxicillin/Clavulanate Potassium (Augmentin 875-125mg)  1 tab PO BID [facility]


   Stop: 18 16:59


   Last Admin: 18 16:41 Dose:  1 tab


Ascorbic Acid (Vitamin C)  500 mg PO DAILY [facility]


   Stop: 18 08:59


   Last Admin: 18 09:05 Dose:  500 mg


Atorvastatin Calcium (Lipitor)  40 mg PO DAILY [facility]


   Stop: 18 08:59


   Last Admin: 18 09:06 Dose:  Not Given


Bisacodyl (Dulcolax 10 Mg Supp)  10 mg RC DAILY PRN


   PRN Reason: Constipation


   Stop: 18 23:40


Carvedilol (Coreg)  50 mg PO DAILY [facility]


   Stop: 18 08:59


   Last Admin: 18 09:06 Dose:  50 mg


Dextrose (Glutose 40%)  37.5 gm PO DAILY PRN


   PRN Reason: LOW SUGAR


   Stop: 18 23:40


Docusate Sodium (Colace)  100 mg PO DAILY [facility]


   Stop: 18 08:59


   Last Admin: 18 08:48 Dose:  Not Given


Escitalopram Oxalate (Lexapro)  20 mg PO HS [facility]


   PRN Reason: Protocol


   Stop: 18 20:59


   Last Admin: 18 20:57 Dose:  20 mg


Fenofibrate (Tricor)  134 mg PO DAILY [facility]


   Stop: 18 08:59


   Last Admin: 18 09:05 Dose:  134 mg


Furosemide (Lasix)  40 mg PO BID [facility]


   Stop: 18 08:59


   Last Admin: 18 16:41 Dose:  40 mg


Glucagon (Glucagen)  1 mg IM DAILY PRN; Protocol


   PRN Reason: LOW SUGAR (BG<70) 


   Stop: 18 23:40


Guaifenesin/Dextromethorphan (Diabetic Tussin Dm Sugar Free)  5 ml PO Q6HR PRN


   PRN Reason: Cough


   Stop: 18 23:40


Insulin Aspart (Novolog Insulin Sliding Scale)  0 units SUBQ ACHS [facility]


   PRN Reason: Protocol


   Stop: 18 07:29


   Last Admin: 18 17:23 Dose:  6 units


Insulin Detemir (Levemir Insulin)  80 units SUBQ HS [facility]


   PRN Reason: Protocol


   Stop: 18 20:59


   Last Admin: 18 21:23 Dose:  80 units


Lactobacillus Rhamnosus (Culturelle 15b)  1 each PO DAILY [facility]


   Stop: 18 08:59


   Last Admin: 18 08:48 Dose:  Not Given


Lorazepam (Ativan)  1 mg PO Q6HR PRN; Protocol


   PRN Reason: Anxiety/agitation


   Stop: 18 23:40


   Last Admin: 18 22:16 Dose:  1 mg


Magnesium Hydroxide (Milk Of Magnesia)  30 ml PO DAILY PRN


   PRN Reason: Constipation


   Stop: 18 23:40


Miscellaneous (Probiotic Screen)  1 ea [facility] PRN PRN


   PRN Reason: PROTOCOL


   Stop: 18 16:44


Quetiapine Fumarate (Seroquel)  50 mg PO TID [facility]


   PRN Reason: Protocol


   Stop: 18 08:59


   Last Admin: 18 15:02 Dose:  50 mg


Rivaroxaban (Xarelto)  20 mg PO 1700 [facility]


   Stop: 18 16:59


   Last Admin: 18 16:41 Dose:  20 mg


Tamsulosin HCl (Flomax)  0.4 mg PO HS [facility]


   Stop: 18 20:59


   Last Admin: 18 20:57 Dose:  0.4 mg


Trazodone HCl (Desyrel)  200 mg PO HS [facility]


   Stop: 18 20:59


   Last Admin: 18 20:57 Dose:  200 mg


Valsartan (Diovan)  320 mg PO DAILY [facility]


   Stop: 18 08:59


   Last Admin: 18 09:05 Dose:  320 mg


Zolpidem Tartrate (Ambien)  5 mg PO HS PRN


   PRN Reason: Insomnia


   Stop: 18 23:56


   Last Admin: 18 22:16 Dose:  5 mg








General: demented


HEENT: NC/AT, PERRLA, EOMI, anicteric sclerae, throat clear


Neck: Supple, No JVD, No thyromegaly, +2 carotid pulse wo bruit, No LAD


Lungs: CTAB


Cardiovascular: RRR, Normal S1, Normal S2, without murmur


Abdomen: non-distended


Extremities: clear


Neurological: no change





Internal Medicine Assmt/Plan





- Assessment


Assessment: 





1.UTI.


2.DM.


3.HTN.


4.BPH.


5.HYPONATREMIA.


6.DEMENTIA.





- Plan


Plan: 





CONTINUE ON CURRENT MEDICATION AND DIET.





Nutritional Asmnt/Malnutr-PDOC





- Dietary Evaluation


Malnutrition Findings (Please click <Entered> for more info): 








Nutritional Asmnt/Malnutrition                             Start:  18 14:

31


Text:                                                      Status: Complete    

  


Freq:                                                                          

  


 Document     18 14:33  LCLAZAROG  (Rec: 18 14:41  LCHENG  JULIO [id number])


 Nutritional Asmnt/Malnutrition


     Patient General Information


      Nutritional Screening                      High Risk


      Diagnosis                                  psychosis


      Pertinent Medical Hx/Surgical Hx           HTN, CAD, CHF, dyslipidemia,


                                                 BPH, psoriasis, DM, anxiety


      Subjective Information                     Pt seen sitting in dinning


                                                 room at time of visit, just


                                                 finished lunch tray. Pt stated


                                                 he does not like cucumber and


                                                 cottage cheeze.


      Current Diet Order/ Nutrition Support      low sodium 2 gm, CCHO, DARI


      Pertinent Medications                      vitamin C, colace, lasix,


                                                 glucagen, novolog, levemir


      Pertinent Labs                              Na 126, K 3.8, Cl 92, BUN


                                                 45, Cr 1.4, Glucose 357, ALb


                                                 3.8


                                                  


     Nutritional Hx/Data


      Height                                     1.75 m


      Height (Calculated Centimeters)            175.3


      Current Weight (lbs)                       102.058 kg


      Weight (Calculated Kilograms)              102.1


      Weight (Calculated Grams)                  528134.3


      Ideal Body Weight                          160


      % Ideal Body Weight                        140


      Body Mass Index (BMI)                      33.2


      Weight Status                              Obese


     GI Symptoms


      GI Symptoms                                None


      Last BM                                    1/24 x 3


      Difficult in:                              None


      Usual diet at home                         pt stated he was not following


                                                 any diet restrictions


      Skin Integrity/Comment:                    ольга Campbell


     Estimated Nutritional Goals


      BEE in Kcals:                              Adj wt of IBW


      Calories/Kcals/Kg                          25-30


      Kcals Calculated                           4744-3003


      Protein:                                   Adj wt of IBW


      Protein g/k


      Protein Calculated                         80


      Fluid: ml                                  2000-2400ml (1ml/kcal)


     Nutritional Problem


      2. Problem


       Problem                                   obesity


       Etiology                                  possible excessive energy


                                                 intake


       Signs/Symptoms:                           BMI 33.2


      1. Problem


       Problem                                   altered nutrition related lab


                                                 values


       Etiology                                  hx of DM, excessive


                                                 carbohydrates intake


       Signs/Symptoms:                           Glucose 357, 


     Malnutrition Alert


      Protein-Calorie Malnutrition               N/A


      Is there a minimum of two criteria         No


       selected?                                 


       Query Text:Check all the applicable       


       criteria. A minimum of two criteria are   


       recommended for diagnosis of either       


       severe or non-severe malnutrition.        


     Intervention/Recommendation


      Comments                                   1. Continue with current diet


                                                 as ordered. Explained [facility], [place]


                                                 diet to pt. Pt receiptive, no


                                                 question or concern, will


                                                 follow the diet.


                                                 2. Monitor PO intake, wt, labs


                                                 and skin integrity


                                                 3. F/U as moderate risk in 3-5


                                                 days, -


     Expected Outcomes/Goals


      Expected Outcomes/Goals                    1. PO intake to meet at least


                                                 75% of nutritional needs.


                                                 2. Wt stability, skin to


                                                 remain intact, labs to improve

## 2018-01-28 NOTE — PSYCHOSOCIAL EVALUATION
DATE OF SERVICE:  



SUBJECTIVE:  The patient was seen and evaluated.  The patient's chart reviewed. 

This is Dr. Ramirez covering for Dr. Collins.



IDENTIFYING DATA:  A 77-year-old male with a history of depression, ____ [facility], nursing home, presented here very angry, irritable and physically

attacking staff and screaming throughout the hospital course.  The patient's

nursing staff reported that he expressed some mild sedation.  Today on

face-to-face evaluation, the patient presents very irritable, easily derails in

his conversations and disengaged and avoiding and extremely a poor historian.



MENTAL STATUS EXAMINATION:  Still irritable, easily agitated, and is disengaged.



ASSESSMENT AND PLAN:  The patient is a 77-year-old male with an extensive

history of aggressive behavior, recently initiated on quetiapine at 50 mg p.o.

t.i.d. We will continue with the current medication regimen, still reaching

steady state to reduce the risk of aggression that he has been exhibiting

especially at the nursing home and we will continue with Lexapro 20 mg a day.





DD: 01/27/2018 13:55

DT: 01/28/2018 12:59

[facility]# [id number]  [id number]

## 2018-01-29 RX ADMIN — INSULIN DETEMIR SCH UNITS: 100 INJECTION, SOLUTION SUBCUTANEOUS at 21:00

## 2018-01-29 RX ADMIN — FENOFIBRATE SCH MG: 134 CAPSULE ORAL at 08:16

## 2018-01-29 RX ADMIN — INSULIN ASPART SCH UNITS: 100 INJECTION, SOLUTION INTRAVENOUS; SUBCUTANEOUS at 17:34

## 2018-01-29 RX ADMIN — Medication SCH TAB: at 08:16

## 2018-01-29 RX ADMIN — Medication SCH EACH: at 08:15

## 2018-01-29 RX ADMIN — INSULIN ASPART SCH UNITS: 100 INJECTION, SOLUTION INTRAVENOUS; SUBCUTANEOUS at 21:00

## 2018-01-29 RX ADMIN — AMOXICILLIN AND CLAVULANATE POTASSIUM SCH TAB: 875; 125 TABLET, FILM COATED ORAL at 08:15

## 2018-01-29 RX ADMIN — INSULIN ASPART SCH: 100 INJECTION, SOLUTION INTRAVENOUS; SUBCUTANEOUS at 07:09

## 2018-01-29 RX ADMIN — INSULIN ASPART SCH UNITS: 100 INJECTION, SOLUTION INTRAVENOUS; SUBCUTANEOUS at 12:23

## 2018-01-29 NOTE — PROGRESS NOTES
DATE:  



The patient was seen and evaluated.  The patient's chart reviewed.  Dr. Ramirez

covering for Dr. Collins.



Overnight, nursing staff reported the patient still needs a lot of redirections,

easily agitated and disorganized.



Today on face-to-face evaluation, the patient denies any side effect of

medication, reporting feeling a bit less sedated.  He does derail easily with

conversations and just want to pick another topic.



MENTAL STATUS EXAMINATION:  Derails, disorganized.



ASSESSMENT AND PLAN:  The patient is a 77-year-old male with a history of

disorganized thought process resulting in aggressive behavior in a nursing home.

 We will continue with the current medication regimen.  He continues to adjust

and less side effects of sedation are noted and due to his active impulsive and

aggressive behaviors, unable to formulate a safety plan outside the structured

environment.





DD: 01/28/2018 10:16

DT: 01/29/2018 01:41

JOB# [id number]  [id number]

## 2018-01-29 NOTE — INTERNAL MEDICINE PROG NOTE
Internal Medicine Subjective





- Subjective


Service Date: 18


Patient seen and examined:: with staff (HE FEELS M), without staff ( NO DIARRHEA

)


Patient is:: non-interactive, in bed, confused


Per staff patient has:: no adverse event





Internal Medicine Objective





- Results


Result Diagrams: 


 18 21:21





 18 21:21


Recent Labs: 


 Laboratory Last Values











WBC  11.8 Th/cmm (4.8-10.8)  H  18  21:21    


 


RBC  4.50 Mil/cmm (3.80-5.80)   18  21:21    


 


Hgb  13.3 gm/dL (12-16)   18  21:21    


 


Hct  40.1 % (41.0-60)  L  18  21:21    


 


MCV  89.0 fl (80-99)   18  21:21    


 


MCH  29.4 pg (27.0-31.0)   18  21:    


 


MCHC Differential  33.1 pg (28.0-36.0)   18  21:21    


 


RDW  12.7 % (11.5-20.0)   18  21:21    


 


Plt Count  245 Th/cmm (150-400)   18  21:21    


 


MPV  8.6 fl  18  21:21    


 


Neutrophils %  90.1 % (40.0-80.0)  H  18  21:21    


 


Lymphocytes %  5.6 % (20.0-50.0)  L  18  21:    


 


Monocytes %  1.9 % (2.0-10.0)  L  18  21:21    


 


Eosinophils %  0.2 % (0.0-5.0)   18  21:    


 


Basophils %  2.2 % (0.0-2.0)  H  18  21:21    


 


Sodium  126 mEq/L (136-145)  L  18  21:21    


 


Potassium  3.8 mEq/L (3.5-5.1)   18  21:21    


 


Chloride  92 mEq/L ()  L  18  21:21    


 


Carbon Dioxide  25.3 mEq/L (21.0-31.0)   18  21:21    


 


Anion Gap  12.5  (7.0-16.0)   18  21:21    


 


BUN  45 mg/dL (7-25)  H  18  21:21    


 


Creatinine  1.4 mg/dL (0.7-1.3)  H  18  21:21    


 


Est GFR ( Amer)  TNP   18  21:21    


 


Est GFR (Non-Af Amer)  TNP   18  21:21    


 


BUN/Creatinine Ratio  32.1   18  21:21    


 


Glucose  357 mg/dL ()  H  18  21:21    


 


POC Glucose  288 MG/DL (70 - 105)  H  18  17:29    


 


Hemoglobin A1c %  12.0 % (4.0-6.0)  H  18  21:21    


 


Calcium  8.9 mg/dL (8.6-10.3)   18  21:21    


 


Total Bilirubin  0.8 mg/dL (0.3-1.0)   18  21:21    


 


AST  22 U/L (13-39)   18  21:21    


 


ALT  21 U/L (7-52)   18  21:21    


 


Alkaline Phosphatase  51 U/L ()   18  21:21    


 


Total Protein  7.4 gm/dL (6.0-8.3)   18  21:21    


 


Albumin  3.8 gm/dL (4.2-5.5)  L  18  21:21    


 


Globulin  3.6 gm/dL  18  21:21    


 


Albumin/Globulin Ratio  1.1  (1.0-1.8)   18  21:21    


 


TSH  1.06 uIU/ml (0.34-5.60)   18  21:21    


 


Urine Source  RANDOM   18  22:05    


 


Urine Color  YELLOW   18  22:05    


 


Urine Clarity  HAZY  (CLEAR)   18  22:05    


 


Urine pH  5.5  (4.6 - 8.0)   18  22:05    


 


Ur Specific Gravity  1.010  (1.005-1.030)   18  22:05    


 


Urine Protein  30 mg/dL (NEGATIVE)  H  01/23/18  22:05    


 


Urine Glucose (UA)  >=1000 mg/dL (NEGATIVE)  H  18  22:05    


 


Urine Ketones  NEGATIVE mg/dL (NEGATIVE)   18  22:05    


 


Urine Blood  TRACE  (NEGATIVE)   18  22:05    


 


Urine Nitrate  POSITIVE  (NEGATIVE)  H  18  22:05    


 


Urine Bilirubin  NEGATIVE  (NEGATIVE)   18  22:05    


 


Urine Urobilinogen  0.2 E.U./dL (0.2 - 1.0)   18  22:05    


 


Ur Leukocyte Esterase  MODERATE  (NEGATIVE)  H  18  22:05    


 


Urine RBC  0-2 /hpf (0-5)  H  18  22:05    


 


Urine WBC  >100 /hpf (0-5)  H  18  22:05    


 


Ur Epithelial Cells  FEW /lpf (FEW)   18  22:05    


 


Urine Bacteria  MANY /hpf (NONE SEEN)  H  18  22:05    














- Physical Exam


Vitals and I&O: 


 Vital Signs











Temp  98.8 F   18 20:00


 


Pulse  70   18 20:00


 


Resp  18   18 20:00


 


BP  142/56   18 17:04


 


Pulse Ox  96   18 20:00








 Intake & Output











 18





 06:59 18:59 06:59


 


Intake Total 120 900 240


 


Output Total   1


 


Balance 120 900 239


 


Intake:   


 


  Oral 120 900 240


 


Output:   


 


  Stool   1


 


Other:   


 


  # Voids 3 3 1


 


  # Bowel Movements 3 3 


 


  Stool Characteristics Soft Soft 





 Liquid Liquid 





 Brown Brown 











Active Medications: 


Current Medications





Acetaminophen (Tylenol)  650 mg PO Q4HR PRN


   PRN Reason: Mild Pain / Temp above 100


   Stop: 18 00:00


   Last Admin: 18 12:11 Dose:  650 mg


Amoxicillin/Clavulanate Potassium (Augmentin 875-125mg)  1 tab PO Q12H [facility]


   Stop: 18 20:59


   Last Admin: 18 20:42 Dose:  1 tab


Ascorbic Acid (Vitamin C)  500 mg PO DAILY [facility]


   Stop: 18 08:59


   Last Admin: 18 08:16 Dose:  500 mg


Atorvastatin Calcium (Lipitor)  40 mg PO DAILY [facility]


   Stop: 18 08:59


   Last Admin: 18 08:16 Dose:  40 mg


Bisacodyl (Dulcolax 10 Mg Supp)  10 mg RC DAILY PRN


   PRN Reason: Constipation


   Stop: 18 23:40


Carvedilol (Coreg)  50 mg PO DAILY [facility]


   Stop: 18 08:59


   Last Admin: 18 08:17 Dose:  50 mg


Dextrose (Glutose 40%)  37.5 gm PO DAILY PRN


   PRN Reason: LOW SUGAR


   Stop: 18 23:40


Docusate Sodium (Colace)  100 mg PO DAILY [facility]


   Stop: 18 08:59


   Last Admin: 18 08:16 Dose:  100 mg


Escitalopram Oxalate (Lexapro)  20 mg PO HS [facility]


   PRN Reason: Protocol


   Stop: 18 20:59


   Last Admin: 18 20:41 Dose:  20 mg


Fenofibrate (Tricor)  134 mg PO DAILY [facility]


   Stop: 18 08:59


   Last Admin: 18 08:16 Dose:  134 mg


Furosemide (Lasix)  40 mg PO BID [facility]


   Stop: 18 08:59


   Last Admin: 18 17:04 Dose:  40 mg


Glucagon (Glucagen)  1 mg IM DAILY PRN; Protocol


   PRN Reason: LOW SUGAR (BG<70) 


   Stop: 18 23:40


Guaifenesin/Dextromethorphan (Diabetic Tussin Dm Sugar Free)  5 ml PO Q6HR PRN


   PRN Reason: Cough


   Stop: 18 23:40


Insulin Aspart (Novolog Insulin Sliding Scale)  0 units SUBQ ACHS [facility]


   PRN Reason: Protocol


   Stop: 18 07:29


   Last Admin: 18 17:34 Dose:  6 units


Insulin Detemir (Levemir Insulin)  80 units SUBQ HS [facility]


   PRN Reason: Protocol


   Stop: 18 20:59


   Last Admin: 18 21:26 Dose:  80 units


Lactobacillus Rhamnosus (Culturelle 15b)  1 each PO DAILY [facility]


   Stop: 02/10/18 08:59


   Last Admin: 18 08:15 Dose:  1 each


Lorazepam (Ativan)  1 mg PO Q6HR PRN; Protocol


   PRN Reason: Anxiety/agitation


   Stop: 18 23:40


   Last Admin: 18 04:27 Dose:  1 mg


Magnesium Hydroxide (Milk Of Magnesia)  30 ml PO DAILY PRN


   PRN Reason: Constipation


   Stop: 18 23:40


Miscellaneous (Probiotic Screen)  1 ea MC PRN PRN


   PRN Reason: PROTOCOL


   Stop: 18 16:44


Quetiapine Fumarate (Seroquel)  50 mg PO TID KYM


   PRN Reason: Protocol


   Stop: 18 08:59


   Last Admin: 18 20:43 Dose:  50 mg


Rivaroxaban (Xarelto)  20 mg PO 1700 KYM


   Stop: 18 16:59


   Last Admin: 18 17:03 Dose:  20 mg


Tamsulosin HCl (Flomax)  0.4 mg PO HS [facility]


   Stop: 18 20:59


   Last Admin: 18 20:41 Dose:  0.4 mg


Trazodone HCl (Desyrel)  200 mg PO HS [facility]


   Stop: 18 20:59


   Last Admin: 18 20:43 Dose:  200 mg


Valsartan (Diovan)  320 mg PO DAILY KYM


   Stop: 18 08:59


   Last Admin: 18 08:21 Dose:  Not Given


Zolpidem Tartrate (Ambien)  5 mg PO HS PRN


   PRN Reason: Insomnia


   Stop: 18 23:56


   Last Admin: 18 20:39 Dose:  5 mg








General: demented


HEENT: NC/AT, PERRLA, EOMI, anicteric sclerae, throat clear


Neck: Supple, No JVD, No thyromegaly, +2 carotid pulse wo bruit, No LAD


Lungs: CTAB


Cardiovascular: RRR, Normal S1, Normal S2, without murmur


Abdomen: non-distended


Extremities: clear


Neurological: no change





Internal Medicine Assmt/Plan





- Assessment


Assessment: 





1.UTI.


2.DM.


3.HTN.


4.BPH.


5.HYPONATREMIA.


6.DEMENTIA.


7.DIARRHEA





- Plan


Plan: 





DC AUGMENTIN .CBC AND CMP IN AM.





Nutritional Asmnt/Malnutr-PDOC





- Dietary Evaluation


Malnutrition Findings (Please click <Entered> for more info): 








Nutritional Asmnt/Malnutrition                             Start:  18 14:

31


Text:                                                      Status: Complete    

  


Freq:                                                                          

  


 Document     18 14:33  JEWEL  (Rec: 18 14:41  JEWEL  JULIO [id number])


 Nutritional Asmnt/Malnutrition


     Patient General Information


      Nutritional Screening                      High Risk


      Diagnosis                                  psychosis


      Pertinent Medical Hx/Surgical Hx           HTN, CAD, CHF, dyslipidemia,


                                                 BPH, psoriasis, DM, anxiety


      Subjective Information                     Pt seen sitting in dinning


                                                 room at time of visit, just


                                                 finished lunch tray. Pt stated


                                                 he does not like cucumber and


                                                 cottage cheeze.


      Current Diet Order/ Nutrition Support      low sodium 2 gm, CCHO, DARI


      Pertinent Medications                      vitamin C, colace, lasix,


                                                 glucagen, novolog, levemir


      Pertinent Labs                              Na 126, K 3.8, Cl 92, BUN


                                                 45, Cr 1.4, Glucose 357, ALb


                                                 3.8


                                                  


     Nutritional Hx/Data


      Height                                     1.75 m


      Height (Calculated Centimeters)            175.3


      Current Weight (lbs)                       102.058 kg


      Weight (Calculated Kilograms)              102.1


      Weight (Calculated Grams)                  025352.3


      Ideal Body Weight                          160


      % Ideal Body Weight                        140


      Body Mass Index (BMI)                      33.2


      Weight Status                              Obese


     GI Symptoms


      GI Symptoms                                None


      Last BM                                    1/24 x 3


      Difficult in:                              None


      Usual diet at home                         pt stated he was not following


                                                 any diet restrictions


      Skin Integrity/Comment:                    ольга Campbell


     Estimated Nutritional Goals


      BEE in Kcals:                              Adj wt of IBW


      Calories/Kcals/Kg                          25-30


      Kcals Calculated                           0987-8607


      Protein:                                   Adj wt of IBW


      Protein g/k


      Protein Calculated                         80


      Fluid: ml                                  2000-2400ml (1ml/kcal)


     Nutritional Problem


      2. Problem


       Problem                                   obesity


       Etiology                                  possible excessive energy


                                                 intake


       Signs/Symptoms:                           BMI 33.2


      1. Problem


       Problem                                   altered nutrition related lab


                                                 values


       Etiology                                  hx of DM, excessive


                                                 carbohydrates intake


       Signs/Symptoms:                           Glucose 357, 


     Malnutrition Alert


      Protein-Calorie Malnutrition               N/A


      Is there a minimum of two criteria         No


       selected?                                 


       Query Text:Check all the applicable       


       criteria. A minimum of two criteria are   


       recommended for diagnosis of either       


       severe or non-severe malnutrition.        


     Intervention/Recommendation


      Comments                                   1. Continue with current diet


                                                 as ordered. Explained CCHO


                                                 diet to pt. Pt receiptive, no


                                                 question or concern, will


                                                 follow the diet.


                                                 2. Monitor PO intake, wt, labs


                                                 and skin integrity


                                                 3. F/U as moderate risk in 3-5


                                                 days, -


     Expected Outcomes/Goals


      Expected Outcomes/Goals                    1. PO intake to meet at least


                                                 75% of nutritional needs.


                                                 2. Wt stability, skin to


                                                 remain intact, labs to improve
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
## 2018-01-30 LAB
ALBUMIN SERPL-MCNC: 4.2 GM/DL (ref 4.2–5.5)
ALBUMIN/GLOB SERPL: 1.2 {RATIO} (ref 1–1.8)
ALP SERPL-CCNC: 47 U/L (ref 34–104)
ALT SERPL-CCNC: 33 U/L (ref 7–52)
ANION GAP SERPL CALC-SCNC: 11.4 MMOL/L (ref 7–16)
AST SERPL-CCNC: 34 U/L (ref 13–39)
BASOPHILS # BLD AUTO: 0 TH/CUMM (ref 0–0.2)
BASOPHILS NFR BLD AUTO: 0.1 % (ref 0–2)
BILIRUB SERPL-MCNC: 0.5 MG/DL (ref 0.3–1)
BUN SERPL-MCNC: 52 MG/DL (ref 7–25)
CALCIUM SERPL-MCNC: 9.8 MG/DL (ref 8.6–10.3)
CHLORIDE SERPL-SCNC: 99 MEQ/L (ref 98–107)
CO2 SERPL-SCNC: 29.5 MEQ/L (ref 21–31)
CREAT SERPL-MCNC: 1.3 MG/DL (ref 0.7–1.3)
EOSINOPHIL # BLD AUTO: 0.3 TH/CMM (ref 0.1–0.4)
EOSINOPHIL NFR BLD AUTO: 5.3 % (ref 0–5)
ERYTHROCYTE [DISTWIDTH] IN BLOOD BY AUTOMATED COUNT: 12.9 % (ref 11.5–20)
GLOBULIN SER-MCNC: 3.6 GM/DL
GLUCOSE SERPL-MCNC: 221 MG/DL (ref 70–105)
HCT VFR BLD CALC: 40.1 % (ref 41–60)
HGB BLD-MCNC: 13.5 GM/DL (ref 12–16)
LYMPHOCYTE AB SER FC-ACNC: 1.4 TH/CMM (ref 1.5–3)
LYMPHOCYTES NFR BLD AUTO: 21.6 % (ref 20–50)
MCH RBC QN AUTO: 29.8 PG (ref 27–31)
MCHC RBC AUTO-ENTMCNC: 33.6 PG (ref 28–36)
MCV RBC AUTO: 88.9 FL (ref 80–99)
MONOCYTES # BLD AUTO: 0.5 TH/CMM (ref 0.3–1)
MONOCYTES NFR BLD AUTO: 7.5 % (ref 2–10)
NEUTROPHILS # BLD: 4.3 TH/CMM (ref 1.8–8)
NEUTROPHILS NFR BLD AUTO: 65.5 % (ref 40–80)
PLATELET # BLD: 292 TH/CMM (ref 150–400)
PMV BLD AUTO: 8.8 FL
POTASSIUM SERPL-SCNC: 3.9 MEQ/L (ref 3.5–5.1)
RBC # BLD AUTO: 4.51 MIL/CMM (ref 3.8–5.8)
SODIUM SERPL-SCNC: 136 MEQ/L (ref 136–145)
WBC # BLD AUTO: 6.5 TH/CMM (ref 4.8–10.8)

## 2018-01-30 RX ADMIN — INSULIN ASPART SCH UNITS: 100 INJECTION, SOLUTION INTRAVENOUS; SUBCUTANEOUS at 20:42

## 2018-01-30 RX ADMIN — Medication SCH TAB: at 08:21

## 2018-01-30 RX ADMIN — INSULIN ASPART SCH UNITS: 100 INJECTION, SOLUTION INTRAVENOUS; SUBCUTANEOUS at 11:45

## 2018-01-30 RX ADMIN — FENOFIBRATE SCH MG: 134 CAPSULE ORAL at 08:20

## 2018-01-30 RX ADMIN — INSULIN ASPART SCH UNITS: 100 INJECTION, SOLUTION INTRAVENOUS; SUBCUTANEOUS at 06:31

## 2018-01-30 RX ADMIN — Medication SCH EACH: at 08:21

## 2018-01-30 RX ADMIN — INSULIN DETEMIR SCH UNITS: 100 INJECTION, SOLUTION SUBCUTANEOUS at 20:38

## 2018-01-30 RX ADMIN — INSULIN ASPART SCH UNITS: 100 INJECTION, SOLUTION INTRAVENOUS; SUBCUTANEOUS at 16:35

## 2018-01-30 NOTE — INTERNAL MEDICINE PROG NOTE
Internal Medicine Subjective





- Subjective


Service Date: 18


Patient seen and examined:: with staff (HE FEELS WELL,NO DIARHIA)


Patient is:: non-interactive, in bed, confused


Per staff patient has:: no adverse event





Internal Medicine Objective





- Results


Result Diagrams: 


 18 07:45





 18 07:45


Recent Labs: 


 Laboratory Last Values











WBC  6.5 Th/cmm (4.8-10.8)  D 18  07:45    


 


RBC  4.51 Mil/cmm (3.80-5.80)   18  07:45    


 


Hgb  13.5 gm/dL (12-16)   18  07:45    


 


Hct  40.1 % (41.0-60)  L  18  07:45    


 


MCV  88.9 fl (80-99)   18  07:45    


 


MCH  29.8 pg (27.0-31.0)   18  07:45    


 


MCHC Differential  33.6 pg (28.0-36.0)   18  07:45    


 


RDW  12.9 % (11.5-20.0)   18  07:45    


 


Plt Count  292 Th/cmm (150-400)   18  07:45    


 


MPV  8.8 fl  18  07:45    


 


Neutrophils %  65.5 % (40.0-80.0)   18  07:45    


 


Lymphocytes %  21.6 % (20.0-50.0)   18  07:45    


 


Monocytes %  7.5 % (2.0-10.0)   18  07:45    


 


Eosinophils %  5.3 % (0.0-5.0)  H  18  07:45    


 


Basophils %  0.1 % (0.0-2.0)   18  07:45    


 


Sodium  136 mEq/L (136-145)   18  07:45    


 


Potassium  3.9 mEq/L (3.5-5.1)   18  07:45    


 


Chloride  99 mEq/L ()   18  07:45    


 


Carbon Dioxide  29.5 mEq/L (21.0-31.0)   18  07:45    


 


Anion Gap  11.4  (7.0-16.0)   18  07:45    


 


BUN  52 mg/dL (7-25)  H  18  07:45    


 


Creatinine  1.3 mg/dL (0.7-1.3)   18  07:45    


 


Est GFR ( Amer)  TNP   18  07:45    


 


Est GFR (Non-Af Amer)  TNP   18  07:45    


 


BUN/Creatinine Ratio  40.0   18  07:45    


 


Glucose  221 mg/dL ()  H  18  07:45    


 


POC Glucose  354 MG/DL (70 - 105)  H  18  16:15    


 


Hemoglobin A1c %  12.0 % (4.0-6.0)  H  18  21:21    


 


Calcium  9.8 mg/dL (8.6-10.3)   18  07:45    


 


Total Bilirubin  0.5 mg/dL (0.3-1.0)   18  07:45    


 


AST  34 U/L (13-39)   18  07:45    


 


ALT  33 U/L (7-52)   18  07:45    


 


Alkaline Phosphatase  47 U/L ()   18  07:45    


 


Total Protein  7.8 gm/dL (6.0-8.3)   18  07:45    


 


Albumin  4.2 gm/dL (4.2-5.5)   18  07:45    


 


Globulin  3.6 gm/dL  18  07:45    


 


Albumin/Globulin Ratio  1.2  (1.0-1.8)   18  07:45    


 


TSH  1.06 uIU/ml (0.34-5.60)   18  21:21    


 


Urine Source  RANDOM   18  22:05    


 


Urine Color  YELLOW   18  22:05    


 


Urine Clarity  HAZY  (CLEAR)   18  22:05    


 


Urine pH  5.5  (4.6 - 8.0)   18  22:05    


 


Ur Specific Gravity  1.010  (1.005-1.030)   18  22:05    


 


Urine Protein  30 mg/dL (NEGATIVE)  H  18  22:05    


 


Urine Glucose (UA)  >=1000 mg/dL (NEGATIVE)  H  18  22:05    


 


Urine Ketones  NEGATIVE mg/dL (NEGATIVE)   18  22:05    


 


Urine Blood  TRACE  (NEGATIVE)   18  22:05    


 


Urine Nitrate  POSITIVE  (NEGATIVE)  H  18  22:05    


 


Urine Bilirubin  NEGATIVE  (NEGATIVE)   18  22:05    


 


Urine Urobilinogen  0.2 E.U./dL (0.2 - 1.0)   18  22:05    


 


Ur Leukocyte Esterase  MODERATE  (NEGATIVE)  H  18  22:05    


 


Urine RBC  0-2 /hpf (0-5)  H  18  22:05    


 


Urine WBC  >100 /hpf (0-5)  H  18  22:05    


 


Ur Epithelial Cells  FEW /lpf (FEW)   18  22:05    


 


Urine Bacteria  MANY /hpf (NONE SEEN)  H  18  22:05    














- Physical Exam


Vitals and I&O: 


 Vital Signs











Temp  97.6 F   18 14:30


 


Pulse  90   18 14:30


 


Resp  20   18 14:30


 


BP  110/54   18 16:39


 


Pulse Ox  95   18 14:30








 Intake & Output











 18





 06:59 18:59 06:59


 


Intake Total 240 1000 


 


Output Total 1  


 


Balance 239 1000 


 


Intake:   


 


  Oral 240 1000 


 


Output:   


 


  Stool 1  


 


Other:   


 


  # Voids 1 4 


 


  # Bowel Movements  2 


 


  Stool Characteristics Soft Soft 





 Liquid Liquid 





 Brown Brown 











Active Medications: 


Current Medications





Acetaminophen (Tylenol)  650 mg PO Q4HR PRN


   PRN Reason: Mild Pain / Temp above 100


   Stop: 18 00:00


   Last Admin: 18 12:11 Dose:  650 mg


Ascorbic Acid (Vitamin C)  500 mg PO DAILY [facility]


   Stop: 18 08:59


   Last Admin: 18 08:21 Dose:  500 mg


Atorvastatin Calcium (Lipitor)  40 mg PO DAILY [facility]


   Stop: 18 08:59


   Last Admin: 18 08:21 Dose:  40 mg


Bisacodyl (Dulcolax 10 Mg Supp)  10 mg RC DAILY PRN


   PRN Reason: Constipation


   Stop: 18 23:40


Carvedilol (Coreg)  50 mg PO DAILY [facility]


   Stop: 18 08:59


   Last Admin: 18 08:26 Dose:  Not Given


Dextrose (Glutose 40%)  37.5 gm PO DAILY PRN


   PRN Reason: LOW SUGAR


   Stop: 18 23:40


Docusate Sodium (Colace)  100 mg PO DAILY [facility]


   Stop: 18 08:59


   Last Admin: 18 08:21 Dose:  100 mg


Escitalopram Oxalate (Lexapro)  20 mg PO [facility]


   PRN Reason: Protocol


   Stop: 18 20:59


   Last Admin: 18 20:41 Dose:  20 mg


Fenofibrate (Tricor)  134 mg PO DAILY [facility]


   Stop: 18 08:59


   Last Admin: 18 08:20 Dose:  134 mg


Furosemide (Lasix)  40 mg PO BID [facility]


   Stop: 18 08:59


   Last Admin: 18 16:39 Dose:  40 mg


Glucagon (Glucagen)  1 mg IM DAILY PRN; Protocol


   PRN Reason: LOW SUGAR (BG<70) 


   Stop: 18 23:40


Guaifenesin/Dextromethorphan (Diabetic Tussin Dm Sugar Free)  5 ml PO Q6HR PRN


   PRN Reason: Cough


   Stop: 18 23:40


Insulin Aspart (Novolog Insulin Sliding Scale)  0 units SUBQ ACHS [facility]


   PRN Reason: Protocol


   Stop: 18 07:29


   Last Admin: 18 16:35 Dose:  10 units


Insulin Detemir (Levemir Insulin)  80 units SUBQ HS [facility]


   PRN Reason: Protocol


   Stop: 18 20:59


   Last Admin: 18 21:00 Dose:  80 units


Lactobacillus Rhamnosus (Culturelle 15b)  1 each PO DAILY [facility]


   Stop: 02/10/18 08:59


   Last Admin: 18 08:21 Dose:  1 each


Lorazepam (Ativan)  1 mg PO Q6HR PRN; Protocol


   PRN Reason: Anxiety/agitation


   Stop: 18 23:40


   Last Admin: 18 23:16 Dose:  1 mg


Magnesium Hydroxide (Milk Of Magnesia)  30 ml PO DAILY PRN


   PRN Reason: Constipation


   Stop: 18 23:40


Miscellaneous (Probiotic Screen)  1 ea MC PRN PRN


   PRN Reason: PROTOCOL


   Stop: 18 16:44


Quetiapine Fumarate (Seroquel)  50 mg PO TID [facility]


   PRN Reason: Protocol


   Stop: 18 08:59


   Last Admin: 18 13:58 Dose:  50 mg


Rivaroxaban (Xarelto)  20 mg PO 1700 [facility]


   Stop: 18 16:59


   Last Admin: 18 16:38 Dose:  20 mg


Tamsulosin HCl (Flomax)  0.4 mg PO HS [facility]


   Stop: 18 20:59


   Last Admin: 18 20:41 Dose:  0.4 mg


Trazodone HCl (Desyrel)  200 mg PO HS [facility]


   Stop: 18 20:59


   Last Admin: 18 20:43 Dose:  200 mg


Valsartan (Diovan)  320 mg PO DAILY [facility]


   Stop: 18 08:59


   Last Admin: 18 08:27 Dose:  Not Given


Zolpidem Tartrate (Ambien)  5 mg PO HS PRN


   PRN Reason: Insomnia


   Stop: 18 23:56


   Last Admin: 18 20:39 Dose:  5 mg








General: demented


HEENT: NC/AT, PERRLA, EOMI, anicteric sclerae, throat clear


Neck: Supple, No JVD, No thyromegaly, +2 carotid pulse wo bruit, No LAD


Lungs: CTAB


Cardiovascular: RRR, Normal S1, Normal S2, without murmur


Abdomen: non-distended


Extremities: clear


Neurological: no change





Internal Medicine Assmt/Plan





- Assessment


Assessment: 





1.DM.


2.HTN.


3.BPH


4.DEMENTIA.








- Plan


Plan: 





CONTINUE ON CURRENT MEDICATION AND DIET.





Nutritional Asmnt/Malnutr-PDOC





- Dietary Evaluation


Malnutrition Findings (Please click <Entered> for more info): 








Nutritional Asmnt/Malnutrition                             Start:  18 14:

31


Text:                                                      Status: Complete    

  


Freq:                                                                          

  


 Document     18 14:33  JEWEL  (Rec: 18 14:41  JWEEL  JULIO [id number])


 Nutritional Asmnt/Malnutrition


     Patient General Information


      Nutritional Screening                      High Risk


      Diagnosis                                  psychosis


      Pertinent Medical Hx/Surgical Hx           HTN, CAD, CHF, dyslipidemia,


                                                 BPH, psoriasis, DM, anxiety


      Subjective Information                     Pt seen sitting in dinning


                                                 room at time of visit, just


                                                 finished lunch tray. Pt stated


                                                 he does not like cucumber and


                                                 cottage cheeze.


      Current Diet Order/ Nutrition Support      low sodium 2 gm, CCHO, DARI


      Pertinent Medications                      vitamin C, colace, lasix,


                                                 glucagen, novolog, levemir


      Pertinent Labs                              Na 126, K 3.8, Cl 92, BUN


                                                 45, Cr 1.4, Glucose 357, ALb


                                                 3.8


                                                  


     Nutritional Hx/Data


      Height                                     1.75 m


      Height (Calculated Centimeters)            175.3


      Current Weight (lbs)                       102.058 kg


      Weight (Calculated Kilograms)              102.1


      Weight (Calculated Grams)                  298049.3


      Ideal Body Weight                          160


      % Ideal Body Weight                        140


      Body Mass Index (BMI)                      33.2


      Weight Status                              Obese


     GI Symptoms


      GI Symptoms                                None


      Last BM                                    1/24 x 3


      Difficult in:                              None


      Usual diet at home                         pt stated he was not following


                                                 any diet restrictions


      Skin Integrity/Comment:                    ольга Campbell


     Estimated Nutritional Goals


      BEE in Kcals:                              Adj wt of IBW


      Calories/Kcals/Kg                          25-30


      Kcals Calculated                           7299-1553


      Protein:                                   Adj wt of IBW


      Protein g/k


      Protein Calculated                         80


      Fluid: ml                                  2000-2400ml (1ml/kcal)


     Nutritional Problem


      2. Problem


       Problem                                   obesity


       Etiology                                  possible excessive energy


                                                 intake


       Signs/Symptoms:                           BMI 33.2


      1. Problem


       Problem                                   altered nutrition related lab


                                                 values


       Etiology                                  hx of DM, excessive


                                                 carbohydrates intake


       Signs/Symptoms:                           Glucose 357, 


     Malnutrition Alert


      Protein-Calorie Malnutrition               N/A


      Is there a minimum of two criteria         No


       selected?                                 


       Query Text:Check all the applicable       


       criteria. A minimum of two criteria are   


       recommended for diagnosis of either       


       severe or non-severe malnutrition.        


     Intervention/Recommendation


      Comments                                   1. Continue with current diet


                                                 as ordered. Explained [facility]


                                                 diet to pt. Pt receiptive, no


                                                 question or concern, will


                                                 follow the diet.


                                                 2. Monitor PO intake, wt, labs


                                                 and skin integrity


                                                 3. F/U as moderate risk in 3-5


                                                 days, -


     Expected Outcomes/Goals


      Expected Outcomes/Goals                    1. PO intake to meet at least


                                                 75% of nutritional needs.


                                                 2. Wt stability, skin to


                                                 remain intact, labs to improve

## 2018-01-30 NOTE — PROGRESS NOTES
DATE:  01/30/2018



Case was discussed with staff of the patient, reviewed records.  The patient

continues to be irritable and angry.  He continues to have poor insight

continues to be unable to make safe plan for self-care.  He is unpredictable,

impulsive, needing redirection.  He is compliant with the medication with no

side effects, no sedation, no nausea, no extrapyramidal symptoms.  Sleeping

better and eating better.  We will continue outpatient group therapy, milieu

therapy, and adjust medication as needed.





DD: 01/30/2018 12:07

DT: 01/30/2018 15:53

[facility]# [id number]  [id number]

## 2018-01-31 RX ADMIN — INSULIN ASPART SCH UNITS: 100 INJECTION, SOLUTION INTRAVENOUS; SUBCUTANEOUS at 06:41

## 2018-01-31 RX ADMIN — FENOFIBRATE SCH MG: 134 CAPSULE ORAL at 08:17

## 2018-01-31 RX ADMIN — Medication SCH EACH: at 08:17

## 2018-01-31 RX ADMIN — Medication SCH TAB: at 08:17

## 2018-01-31 RX ADMIN — INSULIN ASPART SCH UNITS: 100 INJECTION, SOLUTION INTRAVENOUS; SUBCUTANEOUS at 11:24

## 2018-01-31 NOTE — DISCHARGE SUMMARY
DATE OF DISCHARGE:  01/31/2018



THE PATIENT'S AGE:  77.



SEX:  Male.



PHYSICIAN:  Ro Collins M.D., M.P.H.



FINAL DIAGNOSIS/PRIMARY DIAGNOSIS:  Depressive mood disorder, unspecified with

psychotic features.



REASON FOR HOSPITALIZATION:  The patient was admitted to the hospital because of

depression and agitation and the patient was screaming and yelling in the

nursing home and was not able to follow any of staff directions and he was

severely depressed.



HOSPITAL COURSE:  The patient continued to be severely anxious and severely

depressed.  The patient also was feeling hopeless and helpless.  He also was

easily agitated and easily irritable with difficulty following directions.  The

patient was continued on Lexapro and the dose adjusted to 20 mg every day and

also he was given Seroquel adjusted to 50 mg 3 times a day.  Gradually, the

patient's affect was brighter.  The patient was less agitated and less

irritable.  Placement was an issue and the patient's family, which I could not

talk to them couple of times.  They choose the patient to go to [facility].



Physical examination of the patient basically showed no major medical problems.



AFTER DISCHARGE PLANS:  The patient discharged from the hospital, wanted to [facility] with plan for followup there.



EXPECTED OUTCOME AFTER DISCHARGE:  Fair if the patient will continue with his

treatment and continue to take his psychotropic medications.





DD: 01/31/2018 08:48

DT: 01/31/2018 19:16

[facility]# [id number]  [id number]